# Patient Record
Sex: FEMALE | Race: WHITE | HISPANIC OR LATINO | Employment: UNEMPLOYED | URBAN - METROPOLITAN AREA
[De-identification: names, ages, dates, MRNs, and addresses within clinical notes are randomized per-mention and may not be internally consistent; named-entity substitution may affect disease eponyms.]

---

## 2018-04-12 LAB
ABO + RH BLD: NORMAL
HBV SURFACE AG SERPL QL IA: NEGATIVE
HEMOGLOBIN BANDS: NORMAL
HIV 1+2 AB+HIV1 P24 AG SERPL QL IA: NEGATIVE
INDIRECT COOMBS: NEGATIVE
RPR: NEGATIVE
RUBELLA IMMUNE STATUS: NORMAL

## 2018-04-17 LAB — CYSTIC FIBROSIS MUTATION PNL: NEGATIVE

## 2018-06-28 DIAGNOSIS — Z36.89 ENCOUNTER FOR FETAL ANATOMIC SURVEY: Primary | ICD-10-CM

## 2018-07-16 ENCOUNTER — OFFICE VISIT (OUTPATIENT)
Dept: MATERNAL FETAL MEDICINE | Facility: CLINIC | Age: 23
End: 2018-07-16
Payer: MEDICAID

## 2018-07-16 DIAGNOSIS — Z36.89 ENCOUNTER FOR FETAL ANATOMIC SURVEY: ICD-10-CM

## 2018-07-16 DIAGNOSIS — Z36.86 ENCOUNTER FOR ANTENATAL SCREENING FOR CERVICAL LENGTH: Primary | ICD-10-CM

## 2018-07-16 DIAGNOSIS — O26.872 SHORT CERVICAL LENGTH DURING PREGNANCY IN SECOND TRIMESTER: ICD-10-CM

## 2018-07-16 PROCEDURE — 76805 OB US >/= 14 WKS SNGL FETUS: CPT | Mod: 26,S$PBB,, | Performed by: OBSTETRICS & GYNECOLOGY

## 2018-07-16 PROCEDURE — 99499 UNLISTED E&M SERVICE: CPT | Mod: S$PBB,,, | Performed by: OBSTETRICS & GYNECOLOGY

## 2018-07-16 PROCEDURE — 76817 TRANSVAGINAL US OBSTETRIC: CPT | Mod: PBBFAC | Performed by: OBSTETRICS & GYNECOLOGY

## 2018-07-16 PROCEDURE — 76805 OB US >/= 14 WKS SNGL FETUS: CPT | Mod: PBBFAC | Performed by: OBSTETRICS & GYNECOLOGY

## 2018-07-16 PROCEDURE — 76817 TRANSVAGINAL US OBSTETRIC: CPT | Mod: 26,S$PBB,, | Performed by: OBSTETRICS & GYNECOLOGY

## 2018-07-16 NOTE — PROGRESS NOTES
Obstetrical ultrasound completed today.  See report in imaging section of [facility].

## 2018-07-30 ENCOUNTER — OFFICE VISIT (OUTPATIENT)
Dept: MATERNAL FETAL MEDICINE | Facility: CLINIC | Age: 23
End: 2018-07-30
Payer: MEDICAID

## 2018-07-30 DIAGNOSIS — Z36.86 ENCOUNTER FOR ANTENATAL SCREENING FOR CERVICAL LENGTH: ICD-10-CM

## 2018-07-30 PROCEDURE — 76817 TRANSVAGINAL US OBSTETRIC: CPT | Mod: PBBFAC | Performed by: PEDIATRICS

## 2018-07-30 PROCEDURE — 76816 OB US FOLLOW-UP PER FETUS: CPT | Mod: 26,S$PBB,, | Performed by: PEDIATRICS

## 2018-07-30 PROCEDURE — 99499 UNLISTED E&M SERVICE: CPT | Mod: S$PBB,,, | Performed by: PEDIATRICS

## 2018-07-30 PROCEDURE — 76816 OB US FOLLOW-UP PER FETUS: CPT | Mod: PBBFAC | Performed by: PEDIATRICS

## 2018-07-30 PROCEDURE — 76817 TRANSVAGINAL US OBSTETRIC: CPT | Mod: 26,S$PBB,, | Performed by: PEDIATRICS

## 2018-07-30 NOTE — PROGRESS NOTES
"Indication  ========    Evaluation of fetal growth, finish anatomy/ cervix .    Pregnancy History  ==============    Maternal Lab Tests  Result: no screenings  Wants to know gender: yes    Method  ======    Transvaginal ultrasound examination, Transabdominal ultrasound examination. View: Good view.    Pregnancy  =========    Rush pregnancy. Number of fetuses: 1.    Dating  ======    Cycle: regular cycle  GA by "stated dating" 21 w + 4 d  JAVAN by "stated dating": 12/6/2018  Assigned: Dating performed on 07/16/2018, based on the external assessment  Assigned GA 21 w + 4 d  Assigned JAVAN: 12/6/2018    General Evaluation  ==============    Cardiac activity: present.  bpm.  Fetal movements: visualized.  Presentation: cephalic.  Placenta: posterior.  Umbilical cord: previously documented .  Amniotic fluid: normal amount.    Fetal Biometry  ============    Fetal Biometry  Calculated by: Hadlock (BPD-HC-AC-FL)   bpm    Fetal Anatomy  ============    Cranium: normal  4-chamber view: normal  LVOT: normal  Stomach: normal  Kidneys: normal  Bladder: normal  Genitals: documented previously  Lumbar spine: normal  Sacral spine: normal  Gender: male  Wants to know gender: yes  Other: A full anatomy survey previously performed.    Maternal Structures  ===============    Uterus / Cervix  Cervical length 28.1 mm            Impression  =========    The fetal anatomic survey was completed today, and no fetal structural abnormalities were noted.    Growth was not performed (only 2 weeks). The AFV is normal.      CL was <3 cm last visit. Today, TV US measurements are 2.8 cm and 3.1 cm without funneling or any other abnormality.            Recommendation  ==============    1. Return in 2 weeks for growth.  2. Recheck CL.  3. Other testing as indicated.    Thank you again for allowing us to participate in the care of your patients. If you have any questions concerning today's consultation, feel free  to contact me or one of " my partners. We can be reached at [phone] during normal business hours. If you have a question after normal  business hours, please contact Labor and Delivery at [phone].

## 2018-08-13 ENCOUNTER — OFFICE VISIT (OUTPATIENT)
Dept: MATERNAL FETAL MEDICINE | Facility: CLINIC | Age: 23
End: 2018-08-13
Payer: MEDICAID

## 2018-08-13 DIAGNOSIS — Z36.86 ENCOUNTER FOR ANTENATAL SCREENING FOR CERVICAL LENGTH: ICD-10-CM

## 2018-08-13 PROCEDURE — 99499 UNLISTED E&M SERVICE: CPT | Mod: S$PBB,,, | Performed by: PEDIATRICS

## 2018-08-13 PROCEDURE — 76817 TRANSVAGINAL US OBSTETRIC: CPT | Mod: 26,S$PBB,, | Performed by: PEDIATRICS

## 2018-08-13 PROCEDURE — 76817 TRANSVAGINAL US OBSTETRIC: CPT | Mod: PBBFAC | Performed by: PEDIATRICS

## 2018-08-13 NOTE — PROGRESS NOTES
"Indication  ========    cervix.    Pregnancy History  ==============    Maternal Lab Tests  Result: no screenings  Wants to know gender: yes    Method  ======    Transvaginal ultrasound examination, [place] E10. View: Good view.    Pregnancy  =========    Rush pregnancy. Number of fetuses: 1.    Dating  ======    Cycle: regular cycle  GA by "stated dating" 23 w + 4 d  JAVAN by "stated dating": 12/6/2018  Assigned: Dating performed on 07/16/2018, based on the external assessment  Assigned GA 23 w + 4 d  Assigned JAVAN: 12/6/2018    General Evaluation  ==============    Cardiac activity: present.  bpm.    Fetal Biometry  ============    Fetal Biometry  Calculated by: Hadlock (BPD-HC-AC-FL)   bpm    Fetal Anatomy  ============    Gender: male.    Maternal Structures  ===============    Uterus / Cervix  Cervical length 36.6 mm          Impression  =========    TV US CL is 3.7 cm without funneling or beaking.          Recommendation  ==============    Repeat ultrasound study as clinically indicated.    Thank you again for allowing us to participate in the care of your patients. If you have any questions concerning today's consultation, feel free  to contact me or one of my partners. We can be reached at [phone] during normal business hours. If you have a question after normal  business hours, please contact Labor and Delivery at [phone].    "

## 2018-08-23 LAB
GLUCOSE SERPL-MCNC: 60 MG/DL
HCT VFR BLD AUTO: 34 % (ref 36–46)
HGB BLD-MCNC: 11 G/DL (ref 12–16)
MCV RBC AUTO: 96 FL (ref 82–108)
PLATELET # BLD AUTO: 253 K/ΜL (ref 150–399)

## 2018-10-23 ENCOUNTER — APPOINTMENT (OUTPATIENT)
Dept: LAB | Facility: HOSPITAL | Age: 23
End: 2018-10-23
Attending: OBSTETRICS & GYNECOLOGY
Payer: MEDICAID

## 2018-10-23 ENCOUNTER — INITIAL PRENATAL (OUTPATIENT)
Dept: OBSTETRICS AND GYNECOLOGY | Facility: CLINIC | Age: 23
End: 2018-10-23
Payer: MEDICAID

## 2018-10-23 VITALS
HEIGHT: 64 IN | BODY MASS INDEX: 24.54 KG/M2 | SYSTOLIC BLOOD PRESSURE: 102 MMHG | WEIGHT: 143.75 LBS | DIASTOLIC BLOOD PRESSURE: 60 MMHG

## 2018-10-23 DIAGNOSIS — Z34.03 ENCOUNTER FOR SUPERVISION OF NORMAL FIRST PREGNANCY IN THIRD TRIMESTER: ICD-10-CM

## 2018-10-23 DIAGNOSIS — R87.610 ASCUS OF CERVIX WITH NEGATIVE HIGH RISK HPV: ICD-10-CM

## 2018-10-23 LAB
ERYTHROCYTE [DISTWIDTH] IN BLOOD BY AUTOMATED COUNT: 13.6 %
HCT VFR BLD AUTO: 39.4 %
HGB BLD-MCNC: 12.5 G/DL
MCH RBC QN AUTO: 31.1 PG
MCHC RBC AUTO-ENTMCNC: 31.7 G/DL
MCV RBC AUTO: 98 FL
PLATELET # BLD AUTO: 260 K/UL
PMV BLD AUTO: 10.6 FL
RBC # BLD AUTO: 4.02 M/UL
WBC # BLD AUTO: 13.99 K/UL

## 2018-10-23 PROCEDURE — 99213 OFFICE O/P EST LOW 20 MIN: CPT | Mod: TH,S$PBB,, | Performed by: STUDENT IN AN ORGANIZED HEALTH CARE EDUCATION/TRAINING PROGRAM

## 2018-10-23 PROCEDURE — 99999 PR PBB SHADOW E&M-EST. PATIENT-LVL III: CPT | Mod: PBBFAC,,, | Performed by: STUDENT IN AN ORGANIZED HEALTH CARE EDUCATION/TRAINING PROGRAM

## 2018-10-23 PROCEDURE — 85027 COMPLETE CBC AUTOMATED: CPT

## 2018-10-23 PROCEDURE — 99213 OFFICE O/P EST LOW 20 MIN: CPT | Mod: PBBFAC,TH,PO | Performed by: STUDENT IN AN ORGANIZED HEALTH CARE EDUCATION/TRAINING PROGRAM

## 2018-10-23 NOTE — PROGRESS NOTES
Subjective:       Patient ID: Adrianna Berger is a 23 y.o. female.    Chief Complaint:  Routine Prenatal Visit (bowel movements are irregular. pt feels pain when trying to have a bowel movement. pt also is having a breakout of small red bumps on stomach and chest.)      History of Present Illness  Adrianna Berger is a 23 y.o. F at 33w6d presents for initial ob visit after transfer from [facility].   This IUP is complicated by anemia and h/o asthma..  Patient denies contractions, denies vaginal bleeding, denies LOF.   Fetal Movement: normal.            GYN & OB History  Patient's last menstrual period was 2018.   Date of Last Pap: No result found    OB History    Para Term  AB Living   1             SAB TAB Ectopic Multiple Live Births                  # Outcome Date GA Lbr Jr/2nd Weight Sex Delivery Anes PTL Lv   1 Current                   Review of Systems  Review of Systems   Constitutional: Negative for activity change, appetite change, chills and fever.   Eyes: Negative for visual disturbance.   Respiratory: Negative for cough and shortness of breath.    Cardiovascular: Negative for chest pain.   Gastrointestinal: Negative for abdominal pain, constipation, nausea and vomiting.   Genitourinary: Negative for dysuria, frequency, hematuria, pelvic pain, urgency, vaginal bleeding, vaginal discharge and vaginal odor.   Musculoskeletal: Negative for myalgias.   Skin:  Negative for rash.   Neurological: Negative for headaches.   Psychiatric/Behavioral: Negative for depression and sleep disturbance. The patient is not nervous/anxious.            Objective:    Physical Exam:   Constitutional: She is oriented to person, place, and time. She appears well-developed and well-nourished. No distress.    HENT:   Head: Normocephalic and atraumatic.   Nose: Nose normal.    Eyes: Conjunctivae are normal. Right eye exhibits no discharge. Left eye exhibits no discharge. No scleral icterus.     Neck: Normal range of motion. Neck supple.    Cardiovascular: Normal rate, regular rhythm, normal heart sounds and intact distal pulses.  Exam reveals no clubbing and no edema.     Pulmonary/Chest: Effort normal and breath sounds normal. No respiratory distress. She has no wheezes.        Abdominal: Soft. She exhibits no mass. There is no tenderness. There is no rebound.             Musculoskeletal: Normal range of motion and moves all extremeties. She exhibits no edema or tenderness.       Neurological: She is alert and oriented to person, place, and time.    Skin: Skin is warm and dry. No rash noted. She is not diaphoretic. No erythema. Nails show no clubbing.    Psychiatric: She has a normal mood and affect. Her behavior is normal. Judgment and thought content normal.          Assessment:        1. Encounter for supervision of normal first pregnancy in third trimester    2. ASCUS of cervix with negative high risk HPV - needs pap pp               Plan:      Adrianna was seen today for routine prenatal visit.    Diagnoses and all orders for this visit:    Encounter for supervision of normal first pregnancy in third trimester  - no complaints today   - return to care in two weeks   - counseled patient on delivery expectations; all questions answered  - repeat cbc and follow up for possible discontinuation of iron due to patient inability to tolerate due to constipation    ASCUS of cervix with negative high risk HPV - needs pap pp        Orders Placed This Encounter   Procedures    CBC Without Differential    Hemoglobin Electrophoresis,Hgb A2 [place].    OB Glucose Screen    Cystic Fibrosis Mutation Panel    HIV-1 and HIV-2 antibodies    RPR    Rubella antibody, IgG    ABO/Rh    Teresa test, indirect, qualitative    Hbsag - Prenatal       Follow-up in about 2 weeks (around 11/6/2018).      Monae Jolley MD  OBGYN, PGY-1

## 2018-10-23 NOTE — PROGRESS NOTES
"Seen and examined.  Agree with note.  All questions answered  For the constipation, push fluids and maintain a high fiber diet with plenty of roughage. Drink 6-8 large glasses of water daily to avoid constipation in the future. Fruits that start with "p" are high in fiber and good for constipation (i.e. Peaches, plums, pears, pineapple).  Avoid bananas, rice, and bread.  Baked sweet and white potatoes are high in fiber as well as dried fruits and cereals.  Milk of magnesia and miralax may be used a necessary.  Call if symptoms persist or worsen.   "

## 2018-11-06 ENCOUNTER — APPOINTMENT (OUTPATIENT)
Dept: LAB | Facility: HOSPITAL | Age: 23
End: 2018-11-06
Attending: OBSTETRICS & GYNECOLOGY
Payer: MEDICAID

## 2018-11-06 ENCOUNTER — ROUTINE PRENATAL (OUTPATIENT)
Dept: OBSTETRICS AND GYNECOLOGY | Facility: CLINIC | Age: 23
End: 2018-11-06
Payer: MEDICAID

## 2018-11-06 VITALS
WEIGHT: 145.75 LBS | BODY MASS INDEX: 25.01 KG/M2 | DIASTOLIC BLOOD PRESSURE: 64 MMHG | SYSTOLIC BLOOD PRESSURE: 116 MMHG

## 2018-11-06 DIAGNOSIS — D50.9 IRON DEFICIENCY ANEMIA, UNSPECIFIED IRON DEFICIENCY ANEMIA TYPE: ICD-10-CM

## 2018-11-06 DIAGNOSIS — Z34.03 ENCOUNTER FOR SUPERVISION OF NORMAL FIRST PREGNANCY IN THIRD TRIMESTER: Primary | ICD-10-CM

## 2018-11-06 LAB
C TRACH DNA SPEC QL NAA+PROBE: NOT DETECTED
N GONORRHOEA DNA SPEC QL NAA+PROBE: NOT DETECTED

## 2018-11-06 PROCEDURE — 87086 URINE CULTURE/COLONY COUNT: CPT

## 2018-11-06 PROCEDURE — 87081 CULTURE SCREEN ONLY: CPT | Mod: 59

## 2018-11-06 PROCEDURE — 87491 CHLMYD TRACH DNA AMP PROBE: CPT

## 2018-11-06 PROCEDURE — 86703 HIV-1/HIV-2 1 RESULT ANTBDY: CPT

## 2018-11-06 PROCEDURE — 99999 PR PBB SHADOW E&M-EST. PATIENT-LVL II: CPT | Mod: PBBFAC,,, | Performed by: STUDENT IN AN ORGANIZED HEALTH CARE EDUCATION/TRAINING PROGRAM

## 2018-11-06 PROCEDURE — 99213 OFFICE O/P EST LOW 20 MIN: CPT | Mod: TH,S$PBB,, | Performed by: STUDENT IN AN ORGANIZED HEALTH CARE EDUCATION/TRAINING PROGRAM

## 2018-11-06 PROCEDURE — 99212 OFFICE O/P EST SF 10 MIN: CPT | Mod: PBBFAC,TH,PO | Performed by: STUDENT IN AN ORGANIZED HEALTH CARE EDUCATION/TRAINING PROGRAM

## 2018-11-06 PROCEDURE — 86592 SYPHILIS TEST NON-TREP QUAL: CPT

## 2018-11-06 NOTE — PROGRESS NOTES
Subjective:       Patient ID: Adrianna Berger is a 23 y.o. female.    Chief Complaint:  Routine Prenatal Visit      History of Present Illness  Adrianna Berger is a 23 y.o. F at 33w6d presents for initial ob visit after transfer from [facility].   This IUP is complicated by anemia and h/o asthma. Patient has no complaints today. Patient denies contractions, denies vaginal bleeding, denies LOF.   Fetal Movement: normal.                 GYN & OB History  Patient's last menstrual period was 2018.   Date of Last Pap: No result found    OB History    Para Term  AB Living   1             SAB TAB Ectopic Multiple Live Births                  # Outcome Date GA Lbr Jr/2nd Weight Sex Delivery Anes PTL Lv   1 Current                   Review of Systems  Review of Systems   Constitutional: Negative for activity change, appetite change, chills and fever.   Eyes: Negative for visual disturbance.   Respiratory: Negative for cough and shortness of breath.    Cardiovascular: Negative for chest pain.   Gastrointestinal: Negative for abdominal pain, constipation, nausea and vomiting.   Genitourinary: Negative for dysuria, frequency, hematuria, pelvic pain, urgency, vaginal bleeding, vaginal discharge and vaginal odor.   Musculoskeletal: Negative for myalgias.   Skin:  Negative for rash.   Neurological: Negative for headaches.   Psychiatric/Behavioral: Negative for depression and sleep disturbance. The patient is not nervous/anxious.            Objective:    Physical Exam:   Constitutional: She is oriented to person, place, and time. She appears well-developed and well-nourished. No distress.    HENT:   Head: Normocephalic and atraumatic.   Nose: Nose normal.    Eyes: Conjunctivae are normal. Right eye exhibits no discharge. Left eye exhibits no discharge. No scleral icterus.    Neck: Normal range of motion. Neck supple.    Cardiovascular: Normal rate, regular rhythm, normal heart sounds and  intact distal pulses.  Exam reveals no clubbing and no edema.     Pulmonary/Chest: Effort normal and breath sounds normal. No respiratory distress. She has no wheezes.        Abdominal: Soft. She exhibits no mass. There is no tenderness. There is no rebound.   gravid             Musculoskeletal: Normal range of motion and moves all extremeties. She exhibits no edema or tenderness.       Neurological: She is alert and oriented to person, place, and time.    Skin: Skin is warm and dry. No rash noted. She is not diaphoretic. No erythema. Nails show no clubbing.    Psychiatric: She has a normal mood and affect. Her behavior is normal. Judgment and thought content normal.          Assessment:        1. Encounter for supervision of normal first pregnancy in third trimester/flu/tdap    2. Iron deficiency anemia, unspecified iron deficiency anemia type               Plan:      Adrianna was seen today for routine prenatal visit.    Diagnoses and all orders for this visit:    Encounter for supervision of normal first pregnancy in third trimester/flu/tdap  -     Strep B Screen, Vaginal / Rectal  -     RPR  -     HIV-1 and HIV-2 antibodies  -     CBC auto differential  -     C. trachomatis/N. gonorrhoeae by AMP DNA  -     Urine culture  - no complaints; labor precautions reviewed    Iron deficiency anemia, unspecified iron deficiency anemia type  - CBC checked last week and hemoglobin 12.5  - ok'ed patient to discontinue iron pills as she cannot tolerate      Orders Placed This Encounter   Procedures    Strep B Screen, Vaginal / Rectal    C. trachomatis/N. gonorrhoeae by AMP DNA    Urine culture    RPR    HIV-1 and HIV-2 antibodies    CBC auto differential       Follow-up in about 1 week (around 11/13/2018).

## 2018-11-07 LAB
BACTERIA UR CULT: NORMAL
HIV 1+2 AB+HIV1 P24 AG SERPL QL IA: NEGATIVE
RPR SER QL: NORMAL

## 2018-11-08 LAB — BACTERIA SPEC AEROBE CULT: NORMAL

## 2018-11-13 ENCOUNTER — ROUTINE PRENATAL (OUTPATIENT)
Dept: OBSTETRICS AND GYNECOLOGY | Facility: CLINIC | Age: 23
End: 2018-11-13
Payer: MEDICAID

## 2018-11-13 VITALS
SYSTOLIC BLOOD PRESSURE: 116 MMHG | BODY MASS INDEX: 25.13 KG/M2 | WEIGHT: 146.38 LBS | DIASTOLIC BLOOD PRESSURE: 65 MMHG

## 2018-11-13 DIAGNOSIS — Z34.03 ENCOUNTER FOR SUPERVISION OF NORMAL FIRST PREGNANCY IN THIRD TRIMESTER: Primary | ICD-10-CM

## 2018-11-13 PROCEDURE — 99212 OFFICE O/P EST SF 10 MIN: CPT | Mod: PBBFAC,TH,PO | Performed by: STUDENT IN AN ORGANIZED HEALTH CARE EDUCATION/TRAINING PROGRAM

## 2018-11-13 PROCEDURE — 99999 PR PBB SHADOW E&M-EST. PATIENT-LVL II: CPT | Mod: PBBFAC,,, | Performed by: STUDENT IN AN ORGANIZED HEALTH CARE EDUCATION/TRAINING PROGRAM

## 2018-11-13 PROCEDURE — 99213 OFFICE O/P EST LOW 20 MIN: CPT | Mod: TH,S$PBB,, | Performed by: STUDENT IN AN ORGANIZED HEALTH CARE EDUCATION/TRAINING PROGRAM

## 2018-11-13 NOTE — PROGRESS NOTES
Subjective:       Patient ID: Adrianna Berger is a 23 y.o. female.    Chief Complaint:  Routine Prenatal Visit      History of Present Illness  HPI  Adrianna Berger is a 23 y.o. F at 33w6d presents for routine ob visit. Patient is a transfer from [facility].   This IUP is complicated by anemia and h/o asthma. Patient has no complaints today. Patient denies contractions, denies vaginal bleeding, denies LOF.   Fetal Movement: normal.           GYN & OB History  Patient's last menstrual period was 2018.   Date of Last Pap: No result found    OB History    Para Term  AB Living   1             SAB TAB Ectopic Multiple Live Births                  # Outcome Date GA Lbr Jr/2nd Weight Sex Delivery Anes PTL Lv   1 Current                   Review of Systems  Review of Systems   Constitutional: Negative for activity change, appetite change, chills and fever.   Eyes: Negative for visual disturbance.   Respiratory: Negative for cough and shortness of breath.    Cardiovascular: Negative for chest pain.   Gastrointestinal: Negative for abdominal pain, constipation, nausea and vomiting.   Genitourinary: Negative for dysuria, frequency, hematuria, pelvic pain, urgency, vaginal bleeding, vaginal discharge and vaginal odor.   Musculoskeletal: Negative for myalgias.   Skin:  Negative for rash.   Neurological: Negative for headaches.   Psychiatric/Behavioral: Negative for depression and sleep disturbance. The patient is not nervous/anxious.            Objective:    Physical Exam:   Constitutional: She is oriented to person, place, and time. She appears well-developed and well-nourished. No distress.    HENT:   Head: Normocephalic and atraumatic.   Nose: Nose normal.    Eyes: Conjunctivae are normal. Right eye exhibits no discharge. Left eye exhibits no discharge. No scleral icterus.    Neck: Normal range of motion. Neck supple.    Cardiovascular: Normal rate, regular rhythm, normal heart sounds  and intact distal pulses.  Exam reveals no clubbing and no edema.     Pulmonary/Chest: Effort normal and breath sounds normal. No respiratory distress. She has no wheezes.        Abdominal: Soft. She exhibits no mass. There is no tenderness. There is no rebound.             Musculoskeletal: Normal range of motion and moves all extremeties. She exhibits no edema or tenderness.       Neurological: She is alert and oriented to person, place, and time.    Skin: Skin is warm and dry. No rash noted. She is not diaphoretic. No erythema. Nails show no clubbing.    Psychiatric: She has a normal mood and affect. Her behavior is normal. Judgment and thought content normal.          Assessment:        1. Encounter for supervision of normal first pregnancy in third trimester/flu/tdap               Plan:      Adrianna was seen today for routine prenatal visit.    Diagnoses and all orders for this visit:    Encounter for supervision of normal first pregnancy in third trimester/flu/tdap  - patient has no complaints today   - UTD with 3T labs   - labor precautions given      No orders of the defined types were placed in this encounter.      Follow-up in about 1 week (around 11/20/2018).       Monae Jolley MD  OBGYN, PGY-1

## 2018-11-21 ENCOUNTER — ROUTINE PRENATAL (OUTPATIENT)
Dept: OBSTETRICS AND GYNECOLOGY | Facility: CLINIC | Age: 23
End: 2018-11-21
Payer: MEDICAID

## 2018-11-21 VITALS
WEIGHT: 149.94 LBS | BODY MASS INDEX: 25.73 KG/M2 | SYSTOLIC BLOOD PRESSURE: 116 MMHG | DIASTOLIC BLOOD PRESSURE: 60 MMHG

## 2018-11-21 DIAGNOSIS — Z3A.38 38 WEEKS GESTATION OF PREGNANCY: ICD-10-CM

## 2018-11-21 DIAGNOSIS — Z34.03 ENCOUNTER FOR SUPERVISION OF NORMAL FIRST PREGNANCY IN THIRD TRIMESTER: Primary | ICD-10-CM

## 2018-11-21 PROCEDURE — 99212 OFFICE O/P EST SF 10 MIN: CPT | Mod: PBBFAC,TH,PO | Performed by: STUDENT IN AN ORGANIZED HEALTH CARE EDUCATION/TRAINING PROGRAM

## 2018-11-21 PROCEDURE — 99999 PR PBB SHADOW E&M-EST. PATIENT-LVL II: CPT | Mod: PBBFAC,,,

## 2018-11-21 PROCEDURE — 99213 OFFICE O/P EST LOW 20 MIN: CPT | Mod: TH,S$PBB,, | Performed by: OBSTETRICS & GYNECOLOGY

## 2018-11-21 NOTE — PROGRESS NOTES
Complaints today: None. Overall doing well. Denies contractions, vaginal bleeding, and LOF. Reports good fetal movement.    /60   Wt 68 kg (149 lb 14.6 oz)   LMP 2018   BMI 25.73 kg/m²      23 y.o., at 38w0d by Estimated Date of Delivery: 18  Patient Active Problem List   Diagnosis    Encounter for supervision of normal first pregnancy in third trimester/flu/tdap    ASCUS of cervix with negative high risk HPV - needs pap pp     OB History    Para Term  AB Living   1             SAB TAB Ectopic Multiple Live Births                  # Outcome Date GA Lbr Jr/2nd Weight Sex Delivery Anes PTL Lv   1 Current                   Dating reviewed    Allergies and problem list reviewed and updated    Medical and surgical history reviewed    Prenatal labs reviewed and updated    Physical Exam:  ABD: soft, gravid, nontender  SVE: 0/40/-3    Assessment:  22yo  at 38w0d here for DERICK visit, currently doing well.     Plan:   - Labs up to date  - GBS neg  - Labor precautions reviewed, patient voiced understanding  - Follow-up 1 week    Michela Echevarria MD  OBGYN, PGY-1

## 2018-11-28 ENCOUNTER — ROUTINE PRENATAL (OUTPATIENT)
Dept: OBSTETRICS AND GYNECOLOGY | Facility: CLINIC | Age: 23
End: 2018-11-28
Payer: MEDICAID

## 2018-11-28 VITALS
WEIGHT: 149.69 LBS | SYSTOLIC BLOOD PRESSURE: 110 MMHG | BODY MASS INDEX: 25.69 KG/M2 | DIASTOLIC BLOOD PRESSURE: 66 MMHG

## 2018-11-28 DIAGNOSIS — Z34.03 ENCOUNTER FOR SUPERVISION OF NORMAL FIRST PREGNANCY IN THIRD TRIMESTER: Primary | ICD-10-CM

## 2018-11-28 PROCEDURE — 99213 OFFICE O/P EST LOW 20 MIN: CPT | Mod: PBBFAC,TH,PO | Performed by: ADVANCED PRACTICE MIDWIFE

## 2018-11-28 PROCEDURE — 99999 PR PBB SHADOW E&M-EST. PATIENT-LVL III: CPT | Mod: PBBFAC,,, | Performed by: ADVANCED PRACTICE MIDWIFE

## 2018-11-28 PROCEDURE — 99213 OFFICE O/P EST LOW 20 MIN: CPT | Mod: TH,S$PBB,, | Performed by: ADVANCED PRACTICE MIDWIFE

## 2018-11-28 NOTE — PROGRESS NOTES
Chief Complaint   Patient presents with    Routine Prenatal Visit       23 y.o., at 39w0d by Estimated Date of Delivery: 18    Complaints today: Accompanied by her husbans who speaks English.    ROS  OBSTETRICS:   Contractions none   Bleeding none   Loss of fluid none   Fetal mvmnt Reports good FM, reinforced BID  GASTRO:   Nausea denies   Vomiting denies      OB History    Para Term  AB Living   1             SAB TAB Ectopic Multiple Live Births                  # Outcome Date GA Lbr Jr/2nd Weight Sex Delivery Anes PTL Lv   1 Current                   Dating reviewed  Allergies and problem list reviewed and updated  Medical and surgical history reviewed  Prenatal labs reviewed and updated    PHYSICAL EXAM  /66   Wt 67.9 kg (149 lb 11.1 oz)   LMP 2018   BMI 25.69 kg/m²     GENERAL: No acute distress  NEURO: Alert and oriented x3  PSYCH: Normal mood and affect  PULMONARY: Non-labored respiration  ABDomen: Soft, gravid, nontender    ASSESSMENT AND PLAN    G1 Problems (from 18 to present)     Problem Noted Resolved    Encounter for supervision of normal first pregnancy in third trimester/flu/tdap 10/23/2018 by Rosalia Pedraza MD No              Reviewed s/s active labor, rom, bleeding and if occurs report to L&D.  Follow-up: 1 weeks- if remains undelivered will schedule IOL

## 2018-12-04 ENCOUNTER — ANESTHESIA (OUTPATIENT)
Dept: OBSTETRICS AND GYNECOLOGY | Facility: OTHER | Age: 23
End: 2018-12-04
Payer: MEDICAID

## 2018-12-04 ENCOUNTER — HOSPITAL ENCOUNTER (INPATIENT)
Facility: OTHER | Age: 23
LOS: 2 days | Discharge: HOME | End: 2018-12-06
Attending: OBSTETRICS & GYNECOLOGY | Admitting: OBSTETRICS & GYNECOLOGY
Payer: MEDICAID

## 2018-12-04 ENCOUNTER — ANESTHESIA EVENT (OUTPATIENT)
Dept: OBSTETRICS AND GYNECOLOGY | Facility: OTHER | Age: 23
End: 2018-12-04
Payer: MEDICAID

## 2018-12-04 DIAGNOSIS — Z3A.39 PREGNANCY WITH 39 COMPLETED WEEKS GESTATION: ICD-10-CM

## 2018-12-04 DIAGNOSIS — O13.3 TRANSIENT HYPERTENSION OF PREGNANCY IN THIRD TRIMESTER: ICD-10-CM

## 2018-12-04 LAB
ABO + RH BLD: NORMAL
ALBUMIN SERPL BCP-MCNC: 3.1 G/DL
ALP SERPL-CCNC: 181 U/L
ALT SERPL W/O P-5'-P-CCNC: 19 U/L
ANION GAP SERPL CALC-SCNC: 10 MMOL/L
AST SERPL-CCNC: 19 U/L
BASOPHILS # BLD AUTO: 0.02 K/UL
BASOPHILS NFR BLD: 0.1 %
BILIRUB SERPL-MCNC: 0.2 MG/DL
BILIRUB SERPL-MCNC: NORMAL MG/DL
BLD GP AB SCN CELLS X3 SERPL QL: NORMAL
BLOOD URINE, POC: NORMAL
BUN SERPL-MCNC: 14 MG/DL
CALCIUM SERPL-MCNC: 9.5 MG/DL
CHLORIDE SERPL-SCNC: 107 MMOL/L
CO2 SERPL-SCNC: 20 MMOL/L
COLOR, POC UA: NORMAL
CREAT SERPL-MCNC: 0.7 MG/DL
CREAT UR-MCNC: 132.8 MG/DL
DIFFERENTIAL METHOD: ABNORMAL
EOSINOPHIL # BLD AUTO: 0.2 K/UL
EOSINOPHIL NFR BLD: 1.1 %
ERYTHROCYTE [DISTWIDTH] IN BLOOD BY AUTOMATED COUNT: 13.4 %
EST. GFR  (AFRICAN AMERICAN): >60 ML/MIN/1.73 M^2
EST. GFR  (NON AFRICAN AMERICAN): >60 ML/MIN/1.73 M^2
GLUCOSE SERPL-MCNC: 85 MG/DL
GLUCOSE UR QL STRIP: NORMAL
HCT VFR BLD AUTO: 40.8 %
HGB BLD-MCNC: 13.6 G/DL
KETONES UR QL STRIP: NORMAL
LEUKOCYTE ESTERASE URINE, POC: NORMAL
LYMPHOCYTES # BLD AUTO: 1.9 K/UL
LYMPHOCYTES NFR BLD: 11.6 %
MCH RBC QN AUTO: 31.2 PG
MCHC RBC AUTO-ENTMCNC: 33.3 G/DL
MCV RBC AUTO: 94 FL
MONOCYTES # BLD AUTO: 1.3 K/UL
MONOCYTES NFR BLD: 8 %
NEUTROPHILS # BLD AUTO: 13.1 K/UL
NEUTROPHILS NFR BLD: 78.8 %
NITRITE, POC UA: NORMAL
PH, POC UA: 7
PLATELET # BLD AUTO: 224 K/UL
PMV BLD AUTO: 10.8 FL
POTASSIUM SERPL-SCNC: 4.2 MMOL/L
PROT SERPL-MCNC: 6.9 G/DL
PROT UR-MCNC: 10 MG/DL
PROT/CREAT UR: 0.08 MG/G{CREAT}
PROTEIN, POC: NORMAL
RBC # BLD AUTO: 4.36 M/UL
SODIUM SERPL-SCNC: 137 MMOL/L
SPECIFIC GRAVITY, POC UA: 1.02
UROBILINOGEN, POC UA: NORMAL
WBC # BLD AUTO: 16.62 K/UL

## 2018-12-04 PROCEDURE — 25000003 PHARM REV CODE 250: Performed by: STUDENT IN AN ORGANIZED HEALTH CARE EDUCATION/TRAINING PROGRAM

## 2018-12-04 PROCEDURE — 86901 BLOOD TYPING SEROLOGIC RH(D): CPT

## 2018-12-04 PROCEDURE — 82570 ASSAY OF URINE CREATININE: CPT

## 2018-12-04 PROCEDURE — 99285 EMERGENCY DEPT VISIT HI MDM: CPT | Mod: 25

## 2018-12-04 PROCEDURE — 0KQM0ZZ REPAIR PERINEUM MUSCLE, OPEN APPROACH: ICD-10-PCS | Performed by: OBSTETRICS & GYNECOLOGY

## 2018-12-04 PROCEDURE — 27200710 HC EPIDURAL INFUSION PUMP SET: Performed by: UROLOGY

## 2018-12-04 PROCEDURE — 27800517 HC TRAY,EPIDURAL-CONTINUOUS: Performed by: UROLOGY

## 2018-12-04 PROCEDURE — 62326 NJX INTERLAMINAR LMBR/SAC: CPT | Performed by: UROLOGY

## 2018-12-04 PROCEDURE — 4A0HXCZ MEASUREMENT OF PRODUCTS OF CONCEPTION, CARDIAC RATE, EXTERNAL APPROACH: ICD-10-PCS | Performed by: OBSTETRICS & GYNECOLOGY

## 2018-12-04 PROCEDURE — 11000001 HC ACUTE MED/SURG PRIVATE ROOM

## 2018-12-04 PROCEDURE — 51702 INSERT TEMP BLADDER CATH: CPT

## 2018-12-04 PROCEDURE — 85025 COMPLETE CBC W/AUTO DIFF WBC: CPT

## 2018-12-04 PROCEDURE — 59025 FETAL NON-STRESS TEST: CPT | Mod: 26,,, | Performed by: OBSTETRICS & GYNECOLOGY

## 2018-12-04 PROCEDURE — 99284 EMERGENCY DEPT VISIT MOD MDM: CPT | Mod: 25,,, | Performed by: OBSTETRICS & GYNECOLOGY

## 2018-12-04 PROCEDURE — 72200005 HC VAGINAL DELIVERY LEVEL II

## 2018-12-04 PROCEDURE — 59409 OBSTETRICAL CARE: CPT | Mod: AA,,, | Performed by: ANESTHESIOLOGY

## 2018-12-04 PROCEDURE — 59409 OBSTETRICAL CARE: CPT | Mod: GB,,, | Performed by: OBSTETRICS & GYNECOLOGY

## 2018-12-04 PROCEDURE — 59025 FETAL NON-STRESS TEST: CPT

## 2018-12-04 PROCEDURE — 80053 COMPREHEN METABOLIC PANEL: CPT

## 2018-12-04 PROCEDURE — 63600175 PHARM REV CODE 636 W HCPCS: Performed by: STUDENT IN AN ORGANIZED HEALTH CARE EDUCATION/TRAINING PROGRAM

## 2018-12-04 RX ORDER — SODIUM CHLORIDE, SODIUM LACTATE, POTASSIUM CHLORIDE, CALCIUM CHLORIDE 600; 310; 30; 20 MG/100ML; MG/100ML; MG/100ML; MG/100ML
INJECTION, SOLUTION INTRAVENOUS CONTINUOUS
Status: DISCONTINUED | OUTPATIENT
Start: 2018-12-04 | End: 2018-12-04

## 2018-12-04 RX ORDER — NALBUPHINE HYDROCHLORIDE 10 MG/ML
2.5 INJECTION, SOLUTION INTRAMUSCULAR; INTRAVENOUS; SUBCUTANEOUS ONCE
Status: DISCONTINUED | OUTPATIENT
Start: 2018-12-04 | End: 2018-12-04

## 2018-12-04 RX ORDER — FENTANYL/BUPIVACAINE/NS/PF 2MCG/ML-.1
PLASTIC BAG, INJECTION (ML) INJECTION CONTINUOUS
Status: DISCONTINUED | OUTPATIENT
Start: 2018-12-04 | End: 2018-12-04

## 2018-12-04 RX ORDER — OXYTOCIN/RINGER'S LACTATE 20/1000 ML
333 PLASTIC BAG, INJECTION (ML) INTRAVENOUS CONTINUOUS
Status: ACTIVE | OUTPATIENT
Start: 2018-12-04 | End: 2018-12-04

## 2018-12-04 RX ORDER — HYDROCODONE BITARTRATE AND ACETAMINOPHEN 10; 325 MG/1; MG/1
1 TABLET ORAL EVERY 4 HOURS PRN
Status: DISCONTINUED | OUTPATIENT
Start: 2018-12-04 | End: 2018-12-06 | Stop reason: HOSPADM

## 2018-12-04 RX ORDER — CARBOPROST TROMETHAMINE 250 UG/ML
250 INJECTION, SOLUTION INTRAMUSCULAR
Status: DISCONTINUED | OUTPATIENT
Start: 2018-12-04 | End: 2018-12-06 | Stop reason: HOSPADM

## 2018-12-04 RX ORDER — DIPHENHYDRAMINE HYDROCHLORIDE 50 MG/ML
25 INJECTION INTRAMUSCULAR; INTRAVENOUS EVERY 4 HOURS PRN
Status: DISCONTINUED | OUTPATIENT
Start: 2018-12-04 | End: 2018-12-06 | Stop reason: HOSPADM

## 2018-12-04 RX ORDER — DIPHENHYDRAMINE HCL 25 MG
25 CAPSULE ORAL EVERY 4 HOURS PRN
Status: DISCONTINUED | OUTPATIENT
Start: 2018-12-04 | End: 2018-12-06 | Stop reason: HOSPADM

## 2018-12-04 RX ORDER — ONDANSETRON 8 MG/1
8 TABLET, ORALLY DISINTEGRATING ORAL EVERY 8 HOURS PRN
Status: DISCONTINUED | OUTPATIENT
Start: 2018-12-04 | End: 2018-12-06 | Stop reason: HOSPADM

## 2018-12-04 RX ORDER — ONDANSETRON 8 MG/1
8 TABLET, ORALLY DISINTEGRATING ORAL EVERY 8 HOURS PRN
Status: CANCELLED | OUTPATIENT
Start: 2018-12-04

## 2018-12-04 RX ORDER — HYDROCORTISONE 25 MG/G
CREAM TOPICAL 3 TIMES DAILY PRN
Status: DISCONTINUED | OUTPATIENT
Start: 2018-12-04 | End: 2018-12-06 | Stop reason: HOSPADM

## 2018-12-04 RX ORDER — FAMOTIDINE 10 MG/ML
20 INJECTION INTRAVENOUS ONCE
Status: DISCONTINUED | OUTPATIENT
Start: 2018-12-04 | End: 2018-12-06 | Stop reason: HOSPADM

## 2018-12-04 RX ORDER — IBUPROFEN 600 MG/1
600 TABLET ORAL EVERY 6 HOURS
Status: DISCONTINUED | OUTPATIENT
Start: 2018-12-05 | End: 2018-12-06 | Stop reason: HOSPADM

## 2018-12-04 RX ORDER — OXYTOCIN/RINGER'S LACTATE 20/1000 ML
41.7 PLASTIC BAG, INJECTION (ML) INTRAVENOUS CONTINUOUS
Status: ACTIVE | OUTPATIENT
Start: 2018-12-04 | End: 2018-12-04

## 2018-12-04 RX ORDER — OXYTOCIN/RINGER'S LACTATE 20/1000 ML
41.65 PLASTIC BAG, INJECTION (ML) INTRAVENOUS CONTINUOUS
Status: CANCELLED | OUTPATIENT
Start: 2018-12-04 | End: 2018-12-05

## 2018-12-04 RX ORDER — HYDROCODONE BITARTRATE AND ACETAMINOPHEN 5; 325 MG/1; MG/1
1 TABLET ORAL EVERY 4 HOURS PRN
Status: DISCONTINUED | OUTPATIENT
Start: 2018-12-04 | End: 2018-12-06 | Stop reason: HOSPADM

## 2018-12-04 RX ORDER — SODIUM CHLORIDE 9 MG/ML
INJECTION, SOLUTION INTRAVENOUS
Status: DISCONTINUED | OUTPATIENT
Start: 2018-12-04 | End: 2018-12-04

## 2018-12-04 RX ORDER — FENTANYL/BUPIVACAINE/NS/PF 2MCG/ML-.1
PLASTIC BAG, INJECTION (ML) INJECTION
Status: DISPENSED
Start: 2018-12-04 | End: 2018-12-04

## 2018-12-04 RX ORDER — ACETAMINOPHEN 325 MG/1
650 TABLET ORAL EVERY 6 HOURS PRN
Status: DISCONTINUED | OUTPATIENT
Start: 2018-12-04 | End: 2018-12-06 | Stop reason: HOSPADM

## 2018-12-04 RX ORDER — MISOPROSTOL 200 UG/1
600 TABLET ORAL
Status: DISCONTINUED | OUTPATIENT
Start: 2018-12-04 | End: 2018-12-06 | Stop reason: HOSPADM

## 2018-12-04 RX ORDER — OXYTOCIN/RINGER'S LACTATE 20/1000 ML
41.65 PLASTIC BAG, INJECTION (ML) INTRAVENOUS CONTINUOUS
Status: CANCELLED | OUTPATIENT
Start: 2018-12-04 | End: 2018-12-04

## 2018-12-04 RX ORDER — SODIUM CITRATE AND CITRIC ACID MONOHYDRATE 334; 500 MG/5ML; MG/5ML
30 SOLUTION ORAL ONCE
Status: DISCONTINUED | OUTPATIENT
Start: 2018-12-04 | End: 2018-12-06 | Stop reason: HOSPADM

## 2018-12-04 RX ORDER — OXYTOCIN/RINGER'S LACTATE 20/1000 ML
2 PLASTIC BAG, INJECTION (ML) INTRAVENOUS CONTINUOUS
Status: DISCONTINUED | OUTPATIENT
Start: 2018-12-04 | End: 2018-12-04

## 2018-12-04 RX ORDER — METHYLERGONOVINE MALEATE 0.2 MG/ML
200 INJECTION INTRAVENOUS
Status: DISCONTINUED | OUTPATIENT
Start: 2018-12-04 | End: 2018-12-06 | Stop reason: HOSPADM

## 2018-12-04 RX ORDER — OXYTOCIN/RINGER'S LACTATE 20/1000 ML
10 PLASTIC BAG, INJECTION (ML) INTRAVENOUS ONCE
Status: CANCELLED | OUTPATIENT
Start: 2018-12-04 | End: 2018-12-04

## 2018-12-04 RX ORDER — DOCUSATE SODIUM 100 MG/1
200 CAPSULE, LIQUID FILLED ORAL 2 TIMES DAILY PRN
Status: DISCONTINUED | OUTPATIENT
Start: 2018-12-04 | End: 2018-12-06 | Stop reason: HOSPADM

## 2018-12-04 RX ADMIN — IBUPROFEN 600 MG: 600 TABLET ORAL at 11:12

## 2018-12-04 RX ADMIN — SODIUM CHLORIDE, SODIUM LACTATE, POTASSIUM CHLORIDE, AND CALCIUM CHLORIDE: .6; .31; .03; .02 INJECTION, SOLUTION INTRAVENOUS at 08:12

## 2018-12-04 RX ADMIN — SODIUM CHLORIDE, SODIUM LACTATE, POTASSIUM CHLORIDE, AND CALCIUM CHLORIDE 1000 ML: .6; .31; .03; .02 INJECTION, SOLUTION INTRAVENOUS at 09:12

## 2018-12-04 RX ADMIN — Medication 2 MILLI-UNITS/MIN: at 08:12

## 2018-12-04 NOTE — ANESTHESIA PROCEDURE NOTES
Epidural    Patient location during procedure: OB   Reason for block: primary anesthetic   Diagnosis: Intrauterine Pregnancy   Start time: 12/4/2018 8:45 AM  Timeout: 12/4/2018 8:45 AM  End time: 12/4/2018 8:50 AM  Staffing  Anesthesiologist: Susie Drew MD  Resident/CRNA: Telly Leger MD  Other anesthesia staff: Trinity Lazaro MD  Performed: resident/CRNA   Preanesthetic Checklist  Completed: patient identified, site marked, surgical consent, pre-op evaluation, timeout performed, IV checked, risks and benefits discussed, monitors and equipment checked, anesthesia consent given, hand hygiene performed and patient being monitored  Preparation  Patient position: sitting  Prep: ChloraPrep  Patient monitoring: ECG, Pulse Ox and Blood Pressure  Epidural  Skin Anesthetic: lidocaine 1%  Skin Wheal: 3 mL  Administration type: continuous  Approach: midline  Interspace: L3-4  Injection technique: MARYSE air and MARYSE saline  Needle and Epidural Catheter  Needle type: Tuohy   Needle gauge: 17  Needle length: 3.5 inches  Needle insertion depth: 5 cm  Catheter type: [employer]  Catheter size: 19 G  Catheter at skin depth: 10 cm  Additional Documentation: negative aspiration for heme and CSF, no paresthesia on injection, no significant complaints from patient, no signs/symptoms of IV or SA injection and no significant pain on injection  Needle localization: anatomical landmarks  Assessment  Upper dermatomal levels - Left: T9  Right: T12   Dermatomal levels determined by ice  Ease of block: moderate  Patient's tolerance of the procedure: comfortable throughout block  Post dural Puncture Headache?: No

## 2018-12-04 NOTE — ED NOTES
Pt presents to the [facility] c/o abdominal pain and ctxs every 5 mins. Pt placed in assessment 2 and MD notified. Will continue to monitor.

## 2018-12-04 NOTE — PROGRESS NOTES
"LABOR NOTE    S:  Complaints: Yes - pain with contractions.  Epidural working:  not applicable      O: /64   Pulse 63   Temp 97.8 °F (36.6 °C) (Oral)   Resp 16   Ht 5' 4" (1.626 m)   Wt 68 kg (149 lb 14.6 oz)   LMP 2018   SpO2 97%   Breastfeeding? No   BMI 25.73 kg/m²       FHT: 125 Cat 1 (reassuring), moderate BTBV, +accels, no decels  CTX: q 2-3 minutes  SVE: 3/90/0, SROM clr @ 0830      ASSESSMENT:   23 y.o.  at 39w6d    FHT reassuring    Active Hospital Problems    Diagnosis  POA    *Prolonged latent phase of labor [O62.0]  Yes      Resolved Hospital Problems   No resolved problems to display.     0845 3/90/0, SROM clr pit @2mU    PLAN:    Continue Close Maternal/Fetal Monitoring  Pitocin Augmentation per protocol  Recheck 2 hours or PRN      Shruthi Cole MD   OBGYN, PGY-1      "

## 2018-12-04 NOTE — H&P
HISTORY AND PHYSICAL                                                OBSTETRICS          Subjective:       Adrianna Berger is a 23 y.o.  female with IUP at 39w6d weeks gestation who   presents complaining of contractions since 11pm. She states she has been having them every 5 mins.     This IUP is complicated by asthma, Prydeinig speaking only and ASCUS HPV+ pap .  Patient reports contractions, denies vaginal bleeding, denies LOF.   Fetal Movement: normal    Review of Systems   Constitutional: Negative for chills and fever.   Eyes: Negative for visual disturbance.   Respiratory: Negative for shortness of breath.    Cardiovascular: Negative for chest pain and palpitations.   Gastrointestinal: Negative for abdominal pain, constipation, diarrhea, nausea and vomiting.   Genitourinary: Negative for vaginal bleeding, vaginal discharge and urinary incontinence.   Musculoskeletal: Negative for back pain.   Neurological: Negative for seizures, syncope and headaches.   Hematological: Negative for adenopathy. Does not bruise/bleed easily.   Psychiatric/Behavioral: Negative for depression. The patient is not nervous/anxious.      PMHx:   Past Medical History:   Diagnosis Date    Asthma        PSHx:   Past Surgical History:   Procedure Laterality Date    NO PAST SURGERIES         All:   Review of patient's allergies indicates:   Allergen Reactions    Eggs [egg derived] Rash       Meds:   (Not in a hospital admission)    SH:   Social History     Socioeconomic History    Marital status:      Spouse name: Not on file    Number of children: Not on file    Years of education: Not on file    Highest education level: Not on file   Social Needs    Financial resource strain: Not on file    Food insecurity - worry: Not on file    Food insecurity - inability: Not on file    Transportation needs - medical: Not on file    Transportation needs - non-medical: Not on file   Occupational History    Not on file  "  Tobacco Use    Smoking status: Never Smoker    Smokeless tobacco: Never Used   Substance and Sexual Activity    Alcohol use: Yes     Comment: pre preganancy    Drug use: No    Sexual activity: Yes     Partners: Male     Birth control/protection: None   Other Topics Concern    Not on file   Social History Narrative    Not on file       FH:   Family History   Problem Relation Age of Onset    Breast cancer Neg Hx     Colon cancer Neg Hx     Ovarian cancer Neg Hx        OBHx:   Obstetric History       T0      L0     SAB0   TAB0   Ectopic0   Multiple0   Live Births0       # Outcome Date GA Lbr Jr/2nd Weight Sex Delivery Anes PTL Lv   1 Current                   Objective:       BP (!) 142/88   Pulse 63   Temp 97 °F (36.1 °C) (Oral)   Resp 16   Ht 5' 4" (1.626 m)   Wt 68 kg (149 lb 14.6 oz)   LMP 2018   SpO2 97%   Breastfeeding? No   BMI 25.73 kg/m²     Vitals:    18 0608 18 0613 18 0618 18 0620   BP:    (!) 142/88   Pulse: 61 60 60 63   Resp:       Temp:       TempSrc:       SpO2: 96% 97% 97%    Weight:       Height:           General:   alert, appears stated age and cooperative, no apparent distress   HENT:  normocephalic, atraumatic   Eyes:  extraocular movements and conjunctivae normal   Neck:  supple, range of motion normal, no thyromegaly   Lungs:   no respiratory distress   Heart:   regular rate   Abdomen:  soft, non-tender; bowel sounds normal; no masses,  no organomegaly   Extremities negative edema, negative erythema   FHT: 120, moderate BTBV, +accels, -decels;  Cat 1 (reassuring)                 TOCO: Q 3-5 minutes   Presentations: cephalic by ultrasound   Cervix:     Dilation: 3cm    Effacement: 80%    Station:  0    Consistency: firm    Position: posterior     Lab Review  Blood Type O POS  GBBS: negative  Rubella: Immune  RPR: NR  HIV: negative  HepB: negative       Assessment:       39w6d weeks gestation for induction of labor 2/2 elevated " blood pressures    Active Hospital Problems    Diagnosis  POA    *Prolonged latent phase of labor [O62.0]  Yes      Resolved Hospital Problems   No resolved problems to display.          Plan:   1. Encounter for induction of labor   Risks, benefits, alternatives and possible complications have been discussed in detail with the patient.   - Consents signed and to chart  - Admit to Labor and Delivery unit  - US: vertex position verified   - Epidural per Anesthesia  - Draw CBC, T&S  - Notify Staff  - Recheck in 2 hrs or PRN    2. Asthma  - Asymptomatic  - Albuterol PRN  - Continue to monitor    3. Transient HTN  - Asymptomatic  - BP: (102-142)/(60-88) 142/88  - PreE labs, pending  - Continue to monitor    Post-Partum Hemorrhage risk - low    Gudelia Boswell MD  OB/GYN  PGY-1    Admitted for induction of labor.  I have reviewed the resident's note,and agree with the diagnosis and management plan

## 2018-12-04 NOTE — ED PROVIDER NOTES
Encounter Date: 2018       History     Chief Complaint   Patient presents with    Abdominal Pain    Contractions     Adrianna Berger is a 23 y.o. F at 39w6d presents complaining of contractions since 11pm. She states she has been having them every 5 mins.   This IUP is complicated by asthma, Kiswahili speaking only, ASCUS HPV+ pap .  Patient reports contractions, denies vaginal bleeding, denies LOF.   Fetal Movement: normal.            Review of patient's allergies indicates:   Allergen Reactions    Eggs [egg derived] Rash     Past Medical History:   Diagnosis Date    Asthma      Past Surgical History:   Procedure Laterality Date    NO PAST SURGERIES       Family History   Problem Relation Age of Onset    Breast cancer Neg Hx     Colon cancer Neg Hx     Ovarian cancer Neg Hx      Social History     Tobacco Use    Smoking status: Never Smoker    Smokeless tobacco: Never Used   Substance Use Topics    Alcohol use: Yes     Comment: pre preganancy    Drug use: No     Review of Systems   Constitutional: Negative for chills, fatigue and fever.   HENT: Negative for congestion.    Eyes: Negative for visual disturbance.   Respiratory: Negative for cough and shortness of breath.    Cardiovascular: Negative for chest pain and palpitations.   Gastrointestinal: Negative for abdominal distention, abdominal pain, constipation, diarrhea, nausea and vomiting.   Genitourinary: Negative for difficulty urinating, dysuria, hematuria, vaginal bleeding and vaginal discharge.   Skin: Negative for rash.   Neurological: Negative for dizziness, seizures, light-headedness and headaches.   Hematological: Does not bruise/bleed easily.   Psychiatric/Behavioral: Negative for dysphoric mood. The patient is not nervous/anxious.        Physical Exam     Initial Vitals   BP Pulse Resp Temp SpO2   18 0515 18 0513 18 0518 18 0518 18 0513   117/73 69 16 97 °F (36.1 °C) 99 %      MAP       --                 Physical Exam    Vitals reviewed.  Constitutional: She appears well-developed and well-nourished. She is not diaphoretic. No distress.   HENT:   Head: Normocephalic and atraumatic.   Nose: Nose normal.   Eyes: Conjunctivae are normal. No scleral icterus.   Cardiovascular: Normal rate, regular rhythm and intact distal pulses.   Pulmonary/Chest: Breath sounds normal. No respiratory distress.   Abdominal: Soft. She exhibits no distension. There is no tenderness.   Gravid; size = dates; EFW: 7.5 lbs   Musculoskeletal: She exhibits no edema or tenderness.   Neurological: She is alert and oriented to person, place, and time. She has normal reflexes.   Skin: Skin is warm and dry. Capillary refill takes less than 2 seconds. No rash noted.   Psychiatric: She has a normal mood and affect. Her behavior is normal. Judgment and thought content normal.     OB LABOR EXAM:   Pre-Term Labor: No.   Membranes ruptured: No.   Method: Sterile vaginal exam per MD.   Vaginal Bleeding: none present.     Dilatation: 2.   Station: -2.   Effacement: 70%.   Amniotic Fluid Color: no fluid.     Comments: Recheck: 06:30 a.m. Cervix: 3/80/0 soft, posterior; fetal head is very low; cephalic by sutures; pelvis feels adequate       ED Course   Obtain Fetal nonstress test (NST)  Date/Time: 12/4/2018 5:34 AM  Performed by: Florecita Sanchez MD  Authorized by: Florecita Sanchez MD     Nonstress Test:     Variability:  6-25 BPM    Decelerations:  None    Accelerations:  15 bpm    Baseline:  120    Contractions:  Regular    Contraction Frequency:  3-5  Biophysical Profile:     Nonstress Test Interpretation: reactive      Overall Impression:  Reassuring        Labs Reviewed   CBC W/ AUTO DIFFERENTIAL   COMPREHENSIVE METABOLIC PANEL   PROTEIN / CREATININE RATIO, URINE   POCT URINALYSIS, DIPSTICK OR TABLET REAGENT, AUTOMATED, WITH MICROSCOP   TYPE & SCREEN          Imaging Results    None          Medical Decision Making:   History:   Old Medical Records: I  decided to obtain old medical records.  Old Records Summarized: records from clinic visits.       <> Summary of Records: PRenatal records reviewed; transfer of care from [facility].  Patient was treated for Chlamydia in the 2nd trimester; negative GC/CT in November. GBS negative.   Clinical Tests:   Lab Tests: Ordered  Medical Tests: Ordered and Reviewed  ED Management:  - Cervix 2/70/-3, has made cervical change since last night  - 1 hr recheck : 3/80/0 soft, posterior (reach behind the fetal head)  - new onset transient hypertension - possibly due to pain; but trace proteinuria at term - will evaluate for preeclampsia  - ADMIT to L&D and augment labor as needed.               Attending Attestation:   Physician Attestation Statement for Resident:  As the supervising MD   Physician Attestation Statement: I have personally seen and examined this patient.   I agree with the above history. -:   As the supervising MD I agree with the above PE.    As the supervising MD I agree with the above treatment, course, plan, and disposition.  I was personally present during the critical portions of the procedure(s) performed by the resident and was immediately available in the ED to provide services and assistance as needed during the entire procedure.  I have reviewed the following: old records at this facility.                       Clinical Impression:   The primary encounter diagnosis was Prolonged latent phase of labor. Diagnoses of Pregnancy with 39 completed weeks gestation and Transient hypertension of pregnancy in third trimester were also pertinent to this visit.                             Siobhan Anthony MD  12/04/18 0637

## 2018-12-04 NOTE — PROGRESS NOTES
"LABOR NOTE    S:  Complaints: Yes - pain with contractions.  Epidural working:  yes      O: /76   Pulse 78   Temp 98.4 °F (36.9 °C) (Oral)   Resp 16   Ht 5' 4" (1.626 m)   Wt 68 kg (149 lb 14.6 oz)   LMP 2018   SpO2 98%   Breastfeeding? No   BMI 25.73 kg/m²       FHT: 125 Cat 1 (reassuring), moderate BTBV, +accels, early decelerations with contractions  CTX: q 2-3 minutes  SVE: 10/100/0      ASSESSMENT:   23 y.o.  at 39w6d    FHT reassuring    Active Hospital Problems    Diagnosis  POA    *Prolonged latent phase of labor [O62.0]  Yes      Resolved Hospital Problems   No resolved problems to display.     0845 3/90/0, SROM clr pit @2mU  1045 /0, Pit @ 2mU  1245 /0, pit @ 2mU  1445 ant lip/0, pit @ 8mU  1600 ant lip/0, pit @ 8mU  1800 10/100/0, will set up to push    PLAN:    Continue Close Maternal/Fetal Monitoring  Pitocin Augmentation per protocol  Recheck 2 hours or PRN      Shruthi Cole MD   OBGYN, PGY-1      "

## 2018-12-04 NOTE — PROGRESS NOTES
"LABOR NOTE    S:  Complaints: Yes - pain with contractions.  Epidural working:  yes      O: /62   Pulse 76   Temp 97.8 °F (36.6 °C) (Oral)   Resp 16   Ht 5' 4" (1.626 m)   Wt 68 kg (149 lb 14.6 oz)   LMP 2018   SpO2 100%   Breastfeeding? No   BMI 25.73 kg/m²       FHT: 125 Cat 1 (reassuring), moderate BTBV, +accels, no decels  CTX: q 2-3 minutes  SVE:       ASSESSMENT:   23 y.o.  at 39w6d    FHT reassuring    Active Hospital Problems    Diagnosis  POA    *Prolonged latent phase of labor [O62.0]  Yes      Resolved Hospital Problems   No resolved problems to display.     0845 3/90/0, SROM clr pit @2mU  1045 , Pit @ 2mU  1245 , pit @ 2mU    PLAN:    Continue Close Maternal/Fetal Monitoring  Pitocin Augmentation per protocol  Recheck 2 hours or PRN      Shruthi Cole MD   OBGYN, PGY-1      "

## 2018-12-04 NOTE — PROGRESS NOTES
"LABOR NOTE    S:  Complaints: Yes - pain with contractions.  Epidural working:  not applicable      O: /63   Pulse 63   Temp 97.8 °F (36.6 °C) (Oral)   Resp 16   Ht 5' 4" (1.626 m)   Wt 68 kg (149 lb 14.6 oz)   LMP 2018   SpO2 100%   Breastfeeding? No   BMI 25.73 kg/m²       FHT: 125 Cat 1 (reassuring), moderate BTBV, +accels, no decels  CTX: q 2-3 minutes  SVE:       ASSESSMENT:   23 y.o.  at 39w6d    FHT reassuring    Active Hospital Problems    Diagnosis  POA    *Prolonged latent phase of labor [O62.0]  Yes      Resolved Hospital Problems   No resolved problems to display.     0845 3/90/0, SROM clr pit @2mU  1045 , Pit @ 2mU    PLAN:    Continue Close Maternal/Fetal Monitoring  Pitocin Augmentation per protocol  Recheck 2 hours or PRN      Shruthi Cole MD   OBN, PGY-1      "

## 2018-12-04 NOTE — ANESTHESIA PREPROCEDURE EVALUATION
[facility]  Anesthesia Obstetric Evaluation         Patient Name: Adrianna Berger  YOB: 1995  MRN: [id number]    SUBJECTIVE:          2018    Adrianna Berger is a 23 y.o. female  with IUP at 39w6d and PMHx of Bronchial [place] on Pulmicort Inhaler, presenting with contractions q5 minutes since late last night. Denies vaginal bleeding, loss of fetal movement.     No prior surgical procedures  Denies toxic habits    LDA:       Peripheral IV - Single Lumen 18 0640 Right Hand (Active)   Site Assessment Clean;Dry;Intact 2018  6:45 AM   Line Status Saline locked 2018  6:45 AM   Dressing Status Clean;Dry;Intact;Biopatch in place 2018  6:45 AM   Dressing Intervention New dressing 2018  6:45 AM   Number of days: 0        lactated ringers      lactated ringers      oxytocin      oxytocin      oxytocin         Patient Active Problem List   Diagnosis    Encounter for supervision of normal first pregnancy in third trimester/flu/tdap    ASCUS of cervix with negative high risk HPV - needs pap pp    Prolonged latent phase of labor       Review of patient's allergies indicates:   Allergen Reactions    Eggs [egg derived] Rash       Current Inpatient Medications:      No current facility-administered medications on file prior to encounter.      Current Outpatient Medications on File Prior to Encounter   Medication Sig Dispense Refill    iron bis-gly/FA/C/B12/Ca/succ (IRON 21/7 ORAL) Take by mouth.      prenatal vit,calc76/iron/folic (PNV 29-1 ORAL) Take by mouth.         Past Surgical History:   Procedure Laterality Date    NO PAST SURGERIES         Social History     Socioeconomic History    Marital status:      Spouse name: Not on file    Number of children: Not on file    Years of education: Not on file    Highest education level: Not on file   Social Needs    Financial resource strain: Not on file     Food insecurity - worry: Not on file    Food insecurity - inability: Not on file    Transportation needs - medical: Not on file    Transportation needs - non-medical: Not on file   Occupational History    Not on file   Tobacco Use    Smoking status: Never Smoker    Smokeless tobacco: Never Used   Substance and Sexual Activity    Alcohol use: Yes     Comment: pre preganancy    Drug use: No    Sexual activity: Yes     Partners: Male     Birth control/protection: None   Other Topics Concern    Not on file   Social History Narrative    Not on file       OBJECTIVE:     Vital Signs Range (Last 24H):  Temp:  [36.1 °C (97 °F)-36.6 °C (97.9 °F)]   Pulse:  [57-74]   Resp:  [16-18]   BP: (102-142)/(60-88)   SpO2:  [94 %-100 %]       Significant Labs:  Lab Results   Component Value Date    WBC 16.62 (H) 12/04/2018    HGB 13.6 12/04/2018    HCT 40.8 12/04/2018     12/04/2018    ALT 19 12/04/2018    AST 19 12/04/2018     12/04/2018    K 4.2 12/04/2018     12/04/2018    CREATININE 0.7 12/04/2018    BUN 14 12/04/2018    CO2 20 (L) 12/04/2018       Diagnostic Studies:    ==============  Repeat ultrasound study as clinically indicated.  Thank you again for allowing us to participate in the care of your patients. If you have any questions concerning today's consultation, feel free to contact me or one of my  partners. We can be reached at [phone] during normal business hours. If you have a question after normal business hours, please contact Labor and Delivery at  [phone].    EKG: No recent studies available.    2D ECHO:  No results found for this or any previous visit.      ASSESSMENT/PLAN:         Anesthesia Evaluation         Review of Systems      Physical Exam  General:  Well nourished    Airway/Jaw/Neck:  Airway Findings: Mouth Opening: Normal Tongue: Normal  General Airway Assessment: Adult  Mallampati: I  Improves to I with phonation.  TM Distance: Normal, at least 6 cm      Eyes/Ears/Nose:  Eyes/Ears/Nose Findings:    Dental:  Dental Findings: In tact   Chest/Lungs:  Chest/Lungs Clear    Heart/Vascular:  Heart Findings: Normal       Mental Status:  Mental Status Findings:  Cooperative, Alert and Oriented         Anesthesia Plan  Type of Anesthesia, risks & benefits discussed:  Anesthesia Type:  epidural, CSE, general  Patient's Preference:   Intra-op Monitoring Plan: standard ASA monitors  Intra-op Monitoring Plan Comments:   Post Op Pain Control Plan: multimodal analgesia, IV/PO Opioids PRN and per primary service following discharge from PACU  Post Op Pain Control Plan Comments:   Induction:   IV  Beta Blocker:  Patient is not currently on a Beta-Blocker (No further documentation required).       Informed Consent: Patient understands risks and agrees with Anesthesia plan.  Questions answered. Anesthesia consent signed with patient.  ASA Score: 2     Day of Surgery Review of History & Physical:            Ready For Surgery From Anesthesia Perspective.

## 2018-12-04 NOTE — PROGRESS NOTES
"LABOR NOTE    S:  Complaints: Yes - pain with contractions.  Epidural working:  yes      O: /76   Pulse 78   Temp 98.4 °F (36.9 °C) (Oral)   Resp 16   Ht 5' 4" (1.626 m)   Wt 68 kg (149 lb 14.6 oz)   LMP 2018   SpO2 98%   Breastfeeding? No   BMI 25.73 kg/m²       FHT: 125 Cat 1 (reassuring), moderate BTBV, +accels, no decels  CTX: q 2-3 minutes  SVE: anterior lip/0      ASSESSMENT:   23 y.o.  at 39w6d    FHT reassuring    Active Hospital Problems    Diagnosis  POA    *Prolonged latent phase of labor [O62.0]  Yes      Resolved Hospital Problems   No resolved problems to display.     0845 3/0, SROM clr pit @2mU  1045 4/90/0, Pit @ 2mU  1245 8//0, pit @ 2mU  1445 ant lip/0, pit @ 8mU    PLAN:    Continue Close Maternal/Fetal Monitoring  Pitocin Augmentation per protocol  Recheck 2 hours or PRN      Shruthi Cole MD   OBGYN, PGY-1      "

## 2018-12-04 NOTE — PROGRESS NOTES
"LABOR NOTE    S:  Complaints: Yes - pain with contractions.  Epidural working:  yes      O: /76   Pulse 78   Temp 98.4 °F (36.9 °C) (Oral)   Resp 16   Ht 5' 4" (1.626 m)   Wt 68 kg (149 lb 14.6 oz)   LMP 2018   SpO2 98%   Breastfeeding? No   BMI 25.73 kg/m²       FHT: 125 Cat 1 (reassuring), moderate BTBV, +accels, early decelerations with contractions  CTX: q 2-3 minutes  SVE: anterior lip/0      ASSESSMENT:   23 y.o.  at 39w6d    FHT reassuring    Active Hospital Problems    Diagnosis  POA    *Prolonged latent phase of labor [O62.0]  Yes      Resolved Hospital Problems   No resolved problems to display.     0845 3/90/0, SROM clr pit @2mU  1045 4//0, Pit @ 2mU  1245 8//0, pit @ 2mU  1445 ant lip/0, pit @ 8mU  1600 ant lip/0, pit @ 8mU    PLAN:    Continue Close Maternal/Fetal Monitoring  Pitocin Augmentation per protocol  Recheck 2 hours or PRN      Shruthi Cole MD   OBGYN, PGY-1      "

## 2018-12-05 PROBLEM — O13.3 TRANSIENT HYPERTENSION OF PREGNANCY IN THIRD TRIMESTER: Status: ACTIVE | Noted: 2018-12-05

## 2018-12-05 LAB
BASOPHILS # BLD AUTO: 0.01 K/UL
BASOPHILS NFR BLD: 0.1 %
DIFFERENTIAL METHOD: ABNORMAL
EOSINOPHIL # BLD AUTO: 0.2 K/UL
EOSINOPHIL NFR BLD: 1.1 %
ERYTHROCYTE [DISTWIDTH] IN BLOOD BY AUTOMATED COUNT: 13.5 %
HCT VFR BLD AUTO: 35.5 %
HGB BLD-MCNC: 11.8 G/DL
LYMPHOCYTES # BLD AUTO: 2.5 K/UL
LYMPHOCYTES NFR BLD: 14.6 %
MCH RBC QN AUTO: 31.1 PG
MCHC RBC AUTO-ENTMCNC: 33.2 G/DL
MCV RBC AUTO: 94 FL
MONOCYTES # BLD AUTO: 1.1 K/UL
MONOCYTES NFR BLD: 6.3 %
NEUTROPHILS # BLD AUTO: 13.4 K/UL
NEUTROPHILS NFR BLD: 77.5 %
PLATELET # BLD AUTO: 189 K/UL
PMV BLD AUTO: 10.5 FL
RBC # BLD AUTO: 3.79 M/UL
WBC # BLD AUTO: 17.27 K/UL

## 2018-12-05 PROCEDURE — 25000003 PHARM REV CODE 250: Performed by: STUDENT IN AN ORGANIZED HEALTH CARE EDUCATION/TRAINING PROGRAM

## 2018-12-05 PROCEDURE — 72100003 HC LABOR CARE, EA. ADDL. 8 HRS

## 2018-12-05 PROCEDURE — 11000001 HC ACUTE MED/SURG PRIVATE ROOM

## 2018-12-05 PROCEDURE — 36415 COLL VENOUS BLD VENIPUNCTURE: CPT

## 2018-12-05 PROCEDURE — 99232 SBSQ HOSP IP/OBS MODERATE 35: CPT | Mod: ,,, | Performed by: OBSTETRICS & GYNECOLOGY

## 2018-12-05 PROCEDURE — 85025 COMPLETE CBC W/AUTO DIFF WBC: CPT

## 2018-12-05 PROCEDURE — 72100002 HC LABOR CARE, 1ST 8 HOURS

## 2018-12-05 RX ORDER — IBUPROFEN 600 MG/1
600 TABLET ORAL EVERY 6 HOURS PRN
Qty: 30 TABLET | Refills: 1 | Status: SHIPPED | OUTPATIENT
Start: 2018-12-05

## 2018-12-05 RX ADMIN — IBUPROFEN 600 MG: 600 TABLET ORAL at 10:12

## 2018-12-05 RX ADMIN — IBUPROFEN 600 MG: 600 TABLET ORAL at 06:12

## 2018-12-05 RX ADMIN — IBUPROFEN 600 MG: 600 TABLET ORAL at 12:12

## 2018-12-05 RX ADMIN — DOCUSATE SODIUM 200 MG: 100 CAPSULE, LIQUID FILLED ORAL at 09:12

## 2018-12-05 NOTE — PROGRESS NOTES
1100:  After speaking with the patient via the [facility]/ (#[id number]). Pt states that after voiding she feels as if there is residual urine left in her bladder. RN encourage pt to void and to let RN know when she has voided. RN will bladder scan pt after next void. Fundus is deviated to the right at this time. Pt's bleeding is stable. Will continue to monitor.     1300:  At this time pt voided 300 cc. Pt still states that she feels as if she has residual urine left in her bladder. Fundus is still deviated to the right at this time. Bladder scan after void showed that pt has >999 cc left in her bladder at this time. Orders to in/out cath pt.     1330:   Pt was catheterized and the bladder began to drain at this time. A total of 1,200 cc was emptied from the pt's bladder slowly over a time period of 30 minutes. Pt's fundus is now firm and midline. Bleeding is still stable. Orders for pt to void by at least 8 pm. Will continue to monitor.

## 2018-12-05 NOTE — PLAN OF CARE
Problem: Breastfeeding (Adult,Obstetrics,Pediatric)  Goal: Signs and Symptoms of Listed Potential Problems Will be Absent, Minimized or Managed (Breastfeeding)  Signs and symptoms of listed potential problems will be absent, minimized or managed by discharge/transition of care (reference Breastfeeding (Adult,Obstetrics,Pediatric) CPG).  Outcome: Ongoing (interventions implemented as appropriate)   12/05/18 1314   Breastfeeding   Problems Assessed (Breastfeeding) all   Problems Present (Breastfeeding) other (see comments)   Follow basic education

## 2018-12-05 NOTE — L&D DELIVERY NOTE
[facility]  Vaginal Delivery   Operative Note    SUMMARY     Normal spontaneous vaginal delivery of live infant, was placed on mothers abdomen for skin to skin and bulb suctioning performed.  Infant delivered position OA over intact perineum.  Nuchal cord: Yes, cord reduced at perineum.    Spontaneous delivery of placenta and IV pitocin given noting good uterine tone.  2nd degree laceration noted and repaired in normal fashion with 2-0 vicryl.  Patient tolerated delivery well. Sponge needle and lap counted correctly x2.    Indications: Prolonged latent phase of labor  Pregnancy complicated by:   Patient Active Problem List   Diagnosis    Encounter for supervision of normal first pregnancy in third trimester/flu/tdap    ASCUS of cervix with negative high risk HPV - needs pap pp     (spontaneous vaginal delivery)    Prolonged latent phase of labor     Admitting GA: 39w6d    Delivery Information for  Omar Berger    Birth information:  YOB: 2018   Time of birth: 6:31 PM   Sex: male   Head Delivery Date/Time: 2018  6:31 PM   Delivery type: Vaginal, Spontaneous   Gestational Age: 39w6d    Delivery Providers    Delivering clinician:  Evan Isbell Jr., MD   Provider Role    MD Netta Alvarado MD Toki W Carter RN     LEILANI Dailey RN             Measurements    Weight:  3550 g  Length:  55.2 cm  Head circumference:  36.2 cm  Chest circumference:  34.9 cm         Apgars    Living status:  Living  Apgars:   1 min.:   5 min.:   10 min.:   15 min.:   20 min.:     Skin color:   1  1       Heart rate:   2  2       Reflex irritability:   2  2       Muscle tone:   2  2       Respiratory effort:   2  2       Total:   9  9       Apgars assigned by:  LEILANI JAMA         Operative Delivery    Forceps attempted?:  No  Vacuum extractor attempted?:  No         Shoulder Dystocia    Shoulder dystocia present?:  No            Presentation    Presentation:  Vertex  Position:  Occiput Anterior           Interventions/Resuscitation    Method:  Bulb Suctioning, Tactile Stimulation       Cord    Vessels:  3 vessels  Complications:  Nuchal  Nuchal Intervention:  reduced  Nuchal Cord Description:  loose nuchal cord  Number of Loops:  1  Delayed Cord Clamping?:  No  Cord Blood Disposition:  Sent with Baby  Gases Sent?:  Yes  Stem Cell Collection (by MD):  No       Placenta    Placenta delivery date/time:  2018 1834  Placenta removal:  Spontaneous  Placenta appearance:  Intact  Placenta disposition:  discarded           Labor Events:       labor: No     Labor Onset Date/Time:         Dilation Complete Date/Time:         Start Pushing Date/Time:       Rupture Date/Time:              Rupture type:           Fluid Amount:        Fluid Color:        Fluid Odor:        Membrane Status (PeriCalm): SRM (Spontaneous Rupture)      Rupture Date/Time (PeriCalm): 2018 08:30:00      Fluid Amount (PeriCalm): Small      Fluid Color (PeriCalm): Clear       steroids: None     Antibiotics given for GBS: No     Induction:       Indications for induction:        Augmentation:       Indications for augmentation:       Labor complications: None     Additional complications:          Cervical ripening:                     Delivery:      Episiotomy: None     Indication for Episiotomy:       Perineal Lacerations: 2nd Repaired:      Periurethral Laceration: none Repaired:     Labial Laceration: none Repaired:     Sulcus Laceration: none Repaired:     Vaginal Laceration: No Repaired:     Cervical Laceration: No Repaired:     Repair suture:       Repair # of packets: 3     Vaginal delivery QBL (mL): 100      QBL (mL): 0     Combined Blood Loss (mL): 100     Vaginal Sweep Performed: Yes     Surgicount Correct:         Other providers:       Anesthesia    Method:  Epidural          Details (if applicable):  Trial of Labor       Categorization:      Priority:     Indications for :     Incision Type:       Additional  information:  Forceps:    Vacuum:    Breech:    Observed anomalies    Other (Comments):           Evan Isbell MD

## 2018-12-05 NOTE — DISCHARGE SUMMARY
POSTPARTUM PROGRESS NOTE     Adrianna Berger is a 23 y.o.  female PPD #1 status post Spontaneous vaginal delivery at 40w2d in a pregnancy complicated by ACUS/HPV+ and asthma. Patient is doing well this morning. She denies nausea, vomiting, fever or chills. Has not been eating as much given patient was tired from delivery last night.   Patient reports mild abdominal pain that is well relieved by oral pain medications. Lochia is mild and decreasing. Patient is voiding without difficulty and ambulating with no difficulty. She has not passed flatus, and has not had BM.  Patient does plan to breast feed. Defer to post partum visit with Dr. Pedraza for contraception - considering . She desires circumcision - consents completed this AM.       Objective:       Temp:  [96.2 °F (35.7 °C)-98.5 °F (36.9 °C)] 98.5 °F (36.9 °C)  Pulse:  [56-94] 72  Resp:  [16] 16  SpO2:  [95 %-100 %] 99 %  BP: (100-138)/(55-84) 120/73    General:   alert, appears stated age and cooperative   Lungs:   clear to auscultation bilaterally   Heart:   regular rate and rhythm, S1, S2 normal, no murmur, click, rub or gallop   Abdomen:  soft, non-tender; bowel sounds normal; no masses,  no organomegaly   Uterus:  firm located at the umblicus.    Extremities: peripheral pulses normal, no pedal edema, no clubbing or cyanosis     Lab Review  No results found for this or any previous visit (from the past 4 hour(s)).    I/O    Intake/Output Summary (Last 24 hours) at 2018 0624  Last data filed at 2018 0120  Gross per 24 hour   Intake --   Output 300 ml   Net -300 ml        Assessment:     Patient Active Problem List   Diagnosis    Encounter for supervision of normal first pregnancy in third trimester/flu/tdap    ASCUS of cervix with negative high risk HPV - needs pap pp     (spontaneous vaginal delivery)    Transient hypertension of pregnancy in third trimester        Plan:   1. Postpartum care:  - Patient doing well. Continue routine  management and advances.  - Continue PO pain meds. Pain well controlled.  - Heme: H/h: 13.6/40.8>p  - Encourage ambulation  - Circumcision - consents signed this AM  - Contraception - defer to post partum visit with Dr. Pedraza, considering Nexplanon  - Lactation consult PRN    2. HSV2  - Stable  - Continue to monitor    3. ASCUS/HPV+  - Needs colposcopy post partum    4. Asthma  - Stable  - Continue to monitor      Dispo: As patient meets milestones, will plan to discharge PPD#1-2    Gudelia Boswell MD  OB/GYN  PGY-1

## 2018-12-05 NOTE — LACTATION NOTE
12/05/18 1015   Maternal Infant Assessment   Breast Density Bilateral:;soft   Areola Bilateral:;elastic   Nipple(s) Bilateral:;everted  (semi flat, short.)   Infant Assessment   Mouth Size small   Sucking Reflex present   Rooting Reflex present   Swallow Reflex present   LATCH Score   Latch 1-->repeated attempts, holds nipple in mouth, stimulate to suck   Audible Swallowing 1-->a few with stimulation   Type Of Nipple 2-->everted (after stimulation)   Comfort (Breast/Nipple) 2-->soft/nontender   Hold (Positioning) 1-->minimal assist, teach one side: mother does other, staff holds   Score (less than 7 for 2/more consecutive times, consult Lactation Consultant) 7   Breasts WDL   Breasts WDL WDL   Pain/Comfort Assessments   Pain Assessment Performed Yes       Number Scale   Presence of Pain denies   Location nipple(s)   Pain Rating: Rest 0   Pain Rating: Activity 0   Maternal Infant Feeding   Maternal Emotional State assist needed;relaxed   Infant Positioning cross-cradle   Signs of Milk Transfer infant jaw motion present   Presence of Pain no   Comfort Measures Before/During Feeding infant position adjusted;latch adjusted;maternal position adjusted   Time Spent (min) 30-60 min   Latch Assistance yes   Breastfeeding Education adequate infant intake;adequate milk volume;diet;increasing milk supply;milk expression, hand   Feeding Infant   Effective Latch During Feeding yes   Skin-to-Skin Contact During Feeding yes   Lactation Referrals   Lactation Consult Breastfeeding assessment   Lactation Interventions   Attachment Promotion breastfeeding assistance provided   Breastfeeding Assistance assisted with positioning;feeding cue recognition promoted;feeding on demand promoted;feeding session observed;infant latch-on verified;infant suck/swallow verified   Maternal Breastfeeding Support diary/feeding log utilized;encouragement offered;infant-mother separation minimized;lactation counseling provided;maternal hydration  promoted;maternal nutrition promoted;maternal rest encouraged   Latch Promotion positioning assisted   basic education reviewed. Latch assistance provided. Infant cross cradle hold., skin to skin . Latch with good tugs and pulls.

## 2018-12-05 NOTE — LACTATION NOTE
12/05/18 1015   Maternal Infant Assessment   Breast Density Bilateral:;soft   Areola Bilateral:;elastic   Nipple(s) Bilateral:;everted  (semi flat, short.)   Infant Assessment   Mouth Size small   Sucking Reflex present   Rooting Reflex present   Swallow Reflex present   LATCH Score   Latch 1-->repeated attempts, holds nipple in mouth, stimulate to suck   Audible Swallowing 1-->a few with stimulation   Type Of Nipple 2-->everted (after stimulation)   Comfort (Breast/Nipple) 2-->soft/nontender   Hold (Positioning) 1-->minimal assist, teach one side: mother does other, staff holds   Score (less than 7 for 2/more consecutive times, consult Lactation Consultant) 7   Breasts WDL   Breasts WDL WDL   Pain/Comfort Assessments   Pain Assessment Performed Yes       Number Scale   Presence of Pain denies   Location nipple(s)   Pain Rating: Rest 0   Pain Rating: Activity 0   Maternal Infant Feeding   Maternal Emotional State assist needed;relaxed   Infant Positioning cross-cradle   Signs of Milk Transfer infant jaw motion present   Presence of Pain no   Comfort Measures Before/During Feeding infant position adjusted;latch adjusted;maternal position adjusted   Time Spent (min) 30-60 min   Latch Assistance yes   Breastfeeding Education adequate infant intake;adequate milk volume;diet;increasing milk supply;milk expression, hand   Feeding Infant   Effective Latch During Feeding yes   Skin-to-Skin Contact During Feeding yes   Lactation Referrals   Lactation Consult Breastfeeding assessment   Lactation Interventions   Attachment Promotion breastfeeding assistance provided   Breastfeeding Assistance assisted with positioning;feeding cue recognition promoted;feeding on demand promoted;feeding session observed;infant latch-on verified;infant suck/swallow verified   Maternal Breastfeeding Support diary/feeding log utilized;encouragement offered;infant-mother separation minimized;lactation counseling provided;maternal hydration  promoted;maternal nutrition promoted;maternal rest encouraged   Latch Promotion positioning assisted

## 2018-12-05 NOTE — ANESTHESIA POSTPROCEDURE EVALUATION
"Anesthesia Post Evaluation    Patient: Adrianna Berger    Procedure(s) Performed: * No procedures listed *    Final Anesthesia Type: epidural  Patient location during evaluation: labor & delivery  Patient participation: Yes- Able to Participate  Level of consciousness: awake and alert and oriented  Post-procedure vital signs: reviewed and stable  Pain management: adequate  Airway patency: patent  PONV status at discharge: No PONV  Anesthetic complications: no      Cardiovascular status: hemodynamically stable  Respiratory status: unassisted  Hydration status: euvolemic  Follow-up not needed.        Visit Vitals  BP (!) 112/56   Pulse 75   Temp 36.4 °C (97.5 °F) (Oral)   Resp 18   Ht 5' 4" (1.626 m)   Wt 68 kg (149 lb 14.6 oz)   LMP 02/22/2018   SpO2 95%   Breastfeeding? Yes   BMI 25.73 kg/m²       Pain/Hortencia Score: Pain Rating Prior to Med Admin: 6 (12/5/2018 12:00 PM)  Pain Rating Post Med Admin: 4 (12/5/2018 12:30 AM)        "

## 2018-12-05 NOTE — DISCHARGE INSTRUCTIONS
Breastfeeding Discharge Instructions       Feed the baby at the earliest sign of hunger or comfort  o Hands to mouth, sucking motions  o Rooting or searching for something to suck on  o Dont wait for crying - it is a sign of distress     The feedings may be 8-12 times per 24hrs and will not follow a schedule   Avoid pacifiers and bottles for the first 4 weeks   Alternate the breast you start the feeding with, or start with the breast that feels the fullest   Switch breasts when the baby takes himself off the breast or falls asleep   Keep offering breasts until the baby looks full, no longer gives hunger signs, and stays asleep when placed on his back in the crib   If the baby is sleepy and wont wake for a feeding, put the baby skin-to-skin dressed in a diaper against the mothers bare chest   Sleep near your baby   The baby should be positioned and latched on to the breast correctly  o Chest-to-chest, chin in the breast  o Babys lips are flipped outward  o Babys mouth is stretched open wide like a shout  o Babys sucking should feel like tugging to the mother  - The baby should be drinking at the breast:  o You should hear swallowing or gulping throughout the feeding  o You should see milk on the babys lips when he comes off the breast  o Your breasts should be softer when the baby is finished feeding  o The baby should look relaxed at the end of feedings  o After the 4th day and your milk is in:  o The babys poop should turn bright yellow and be loose, watery, and seedy  o The baby should have at least 3-4 poops the size of the palm of your hand per day  o The baby should have at least 5-6 wet diapers per day  o The urine should be light yellow in color  You should drink when you are thirsty and eat a healthy diet when you are    hungry.     Take naps to get the rest you need.   Take medications and/or drink alcohol only with permission of your obstetrician    or the babys pediatrician.  You can  also call the Infant Risk Center,   ([phone]), Monday-Friday, 8am-5pm Central time, to get the most   up-to-date evidence-based information on the use of medications during   pregnancy and breastfeeding.      The baby should be examined by a pediatrician at 3-5 days of age.  Once your   milk comes in, the baby should be gaining at least ½ - 1oz each day and should be back to birthweight no later than 10-14 days of age.          Community Resources    [facility] Breastfeeding Warmline: [phone]   Local [facility] clinics: provide incentives and breastpumps to eligible mothers  [employer] ([employer]LI):  mother-to-mother support group website        www.[employer]  Local La Leche League mother-to-mother support groups:        www.[employer]        La Leche League [facility]   Dr. Aristides Sellers website for latch videos and general information:        www.breastfeedinginc.ca  [facility]ant Risk Center is a call center that provides information about the safety of taking medications while breastfeeding.  Call [phone], M-F, 8am-5pm, [place].  International Lactation Consultant Association provides resources for assistance:        www.ilca.org  [place] Breastfeeding Coalition provides informationand resources for parents  and the community    www.LaBreastfeedingSupport.org     Olga Lidia Fenton is a mom-to-mom support group:                             www.[employer].com//breastfeedng-support/  [employer] for Healthy Babies:  0-023-949-BABY(2439)  [employer]: a breastfeeding support group for women of color, [phone]

## 2018-12-06 VITALS
HEART RATE: 68 BPM | TEMPERATURE: 99 F | DIASTOLIC BLOOD PRESSURE: 70 MMHG | HEIGHT: 64 IN | WEIGHT: 149.94 LBS | RESPIRATION RATE: 18 BRPM | SYSTOLIC BLOOD PRESSURE: 116 MMHG | BODY MASS INDEX: 25.6 KG/M2 | OXYGEN SATURATION: 98 %

## 2018-12-06 PROCEDURE — 25000003 PHARM REV CODE 250: Performed by: STUDENT IN AN ORGANIZED HEALTH CARE EDUCATION/TRAINING PROGRAM

## 2018-12-06 PROCEDURE — 99238 HOSP IP/OBS DSCHRG MGMT 30/<: CPT | Mod: ,,, | Performed by: OBSTETRICS & GYNECOLOGY

## 2018-12-06 RX ADMIN — IBUPROFEN 600 MG: 600 TABLET ORAL at 07:12

## 2018-12-06 NOTE — PROGRESS NOTES
POSTPARTUM PROGRESS NOTE     Adrianna Berger is a 23 y.o.  female PPD #2 status post Spontaneous vaginal delivery at 40w2d in a pregnancy complicated by ACUS/HPV+ and asthma. Patient is doing well this morning. She denies nausea, vomiting, fever or chills.  Patient reports mild abdominal pain that is well relieved by oral pain medications. Lochia is mild and decreasing. Patient is voiding without difficulty and ambulating with no difficulty. She has passed flatus, and has not had BM.  Patient does plan to breast feed. Defer to post partum visit with Dr. Pedraza for contraception - considering . She desires circumcision.     Objective:       Temp:  [97.5 °F (36.4 °C)-98.2 °F (36.8 °C)] 98.2 °F (36.8 °C)  Pulse:  [62-75] 62  Resp:  [18] 18  SpO2:  [95 %-97 %] 97 %  BP: (109-112)/(56-69) 110/69    General:   alert, appears stated age and cooperative   Lungs:   clear to auscultation bilaterally   Heart:   regular rate and rhythm, S1, S2 normal, no murmur, click, rub or gallop   Abdomen:  soft, non-tender; bowel sounds normal; no masses,  no organomegaly   Uterus:  firm located at the umblicus.    Extremities: peripheral pulses normal, no pedal edema, no clubbing or cyanosis     Lab Review  No results found for this or any previous visit (from the past 4 hour(s)).    I/O    Intake/Output Summary (Last 24 hours) at 2018 0631  Last data filed at 2018 1800  Gross per 24 hour   Intake --   Output 950 ml   Net -950 ml        Assessment:     Patient Active Problem List   Diagnosis    Encounter for supervision of normal first pregnancy in third trimester/flu/tdap    ASCUS of cervix with negative high risk HPV - needs pap pp     (spontaneous vaginal delivery)    Transient hypertension of pregnancy in third trimester        Plan:   1. Postpartum care:  - Patient doing well. Continue routine management and advances.  - Continue PO pain meds. Pain well controlled.  - Heme: H/h: 13.6/40.8> 11.8/35.5  - Encourage  ambulation  - Circumcision - consents reviewed and signed  - Contraception - defer to post partum visit with Dr. Pedraza, considering Nexplanon  - Lactation consult PRN    2. HSV2:  - Stable  - Continue to monitor    3. ASCUS/HPV+:  - Needs colposcopy post partum    4. Asthma:  - Stable  - Continue to monitor      Dispo: As patient meets milestones, will plan to discharge today.    Michela Echevarria MD  OBGYN, PGY-1

## 2018-12-06 NOTE — DISCHARGE SUMMARY
Delivery Discharge Summary  Obstetrics      Primary OB Clinician: [facility]    Admission date: 2018  Discharge date: 2018    Disposition: To home, self care    Discharge Diagnosis List:      Patient Active Problem List   Diagnosis    Encounter for supervision of normal first pregnancy in third trimester/flu/tdap    ASCUS of cervix with negative high risk HPV - needs pap pp     (spontaneous vaginal delivery)    Transient hypertension of pregnancy in third trimester       Procedure:     Hospital Course:  Adrianna Berger is a 23 y.o. now , PPD #2 who was admitted on 2018 at 39w6d for normal labor. Patient was subsequently admitted to labor and delivery unit with signed consents. Labor course was uncomplicated and resulted in  without complications.     Please see delivery note for further details. Her postpartum course was uncomplicated. On discharge day, patient's pain is controlled with oral pain medications. Pt is tolerating ambulation without SOB or CP, and regular diet without N/V. Reports lochia is mild. Denies any HA, vision changes, F/C, LE swelling. Denies any breast pain/soreness.    Pt in stable condition and ready for discharge. She has been instructed to start and/or continue medications and follow up with her obstetrics provider as listed below.    Pertinent studies:  Postpartum CBC  Lab Results   Component Value Date    WBC 17.27 (H) 2018    HGB 11.8 (L) 2018    HCT 35.5 (L) 2018    MCV 94 2018     2018       There is no immunization history for the selected administration types on file for this patient.     Delivery:    Episiotomy: None   Lacerations: 2nd   Repair suture:     Repair # of packets: 3   Blood loss (ml): 100     Birth information:  YOB: 2018   Time of birth: 6:31 PM   Sex: male   Delivery type: Vaginal, Spontaneous   Gestational Age: 39w6d    Delivery Clinician:      Other providers:       Additional   information:  Forceps:    Vacuum:    Breech:    Observed anomalies      Living?:           APGARS  One minute Five minutes Ten minutes   Skin color:         Heart rate:         Grimace:         Muscle tone:         Breathing:         Totals: 9  9        Placenta: Delivered:       appearance      Patient Instructions:   Current Discharge Medication List      START taking these medications    Details   ibuprofen (ADVIL,MOTRIN) 600 MG tablet Take 1 tablet (600 mg total) by mouth every 6 (six) hours as needed for Pain (cramping).  Qty: 30 tablet, Refills: 1         CONTINUE these medications which have NOT CHANGED    Details   iron bis-gly/FA/C/B12/Ca/succ (IRON 21/7 ORAL) Take by mouth.      prenatal vit,calc76/iron/folic (PNV 29-1 ORAL) Take by mouth.             Discharge Procedure Orders   Diet Adult Regular     Notify your health care provider if you experience any of the following:   Order Comments: Heavy vaginal bleeding saturating more than 1 pad per hr for at least consecutive 2 hrs.     Notify your health care provider if you experience any of the following:  increased confusion or weakness     Notify your health care provider if you experience any of the following:  persistent dizziness, light-headedness, or visual disturbances     Notify your health care provider if you experience any of the following:  severe persistent headache     Notify your health care provider if you experience any of the following:  difficulty breathing or increased cough     Notify your health care provider if you experience any of the following:  severe uncontrolled pain     Notify your health care provider if you experience any of the following:  persistent nausea and vomiting or diarrhea     Notify your health care provider if you experience any of the following:  temperature >100.4     Activity as tolerated   Order Comments: Pelvic rest until follow up visit. Nothing in vagina -no sex, tampons, douching, etc.       Follow-up  Information     [facility]. Schedule an appointment as soon as possible for a visit in 1 week.    Specialty:  Obstetrics and Gynecology  Why:  blood pressure check  Contact information:  [address] [facility] [address] [phone]           [facility]. Schedule an appointment as soon as possible for a visit in 6 weeks.    Specialty:  Obstetrics and Gynecology  Why:  post-partum visit  Contact information:  [address] [facility] [address] [phone]                  Michela Echevarria MD  OBGYN, PGY-1

## 2018-12-06 NOTE — PLAN OF CARE
Problem: Patient Care Overview  Goal: Plan of Care Review  Outcome: Outcome(s) achieved Date Met: 12/06/18  LACTATION NOTE:  Mother will nurse baby on cue till content; will achieve deep latch onto breast tissue and observe for signs of milk transfer; will apply lanolin to nipples pc; will monitor baby's 24hr diaper counts; will call Warmline prn.

## 2018-12-06 NOTE — LACTATION NOTE
"   12/06/18 0900   Maternal Infant Assessment   Breast Density Bilateral:;soft   Areola Bilateral:;elastic   Nipple(s) Bilateral:;graspable;everted   Nipple Symptoms bilateral:;tender;redness   Infant Assessment   Sucking Reflex present   Rooting Reflex present   Swallow Reflex present   LATCH Score   Latch 2-->grasps breast, tongue down, lips flanged, rhythmic sucking   Audible Swallowing 2-->spontaneous and intermittent (24 hrs old)   Type Of Nipple 2-->everted (after stimulation)   Comfort (Breast/Nipple) 1-->filling, red/small blisters/bruises, mild/mod discomfort   Hold (Positioning) 2-->no assist from staff, mother able to position/hold infant   Score (less than 7 for 2/more consecutive times, consult Lactation Consultant) 9   Pain/Comfort Assessments   Pain Assessment Performed Yes       Number Scale   Presence of Pain complains of pain/discomfort   Location nipple(s)   Pain Rating: Activity 4  (initial latch on score is 6)   Factors that Relieve Pain (correct latch,lanolin pc applied to nipples)   Maternal Infant Feeding   Maternal Emotional State independent;relaxed   Infant Positioning clutch/"football"   Signs of Milk Transfer infant jaw motion present   Time Spent (min) 15-30 min   Nipple Shape After Feeding, Right (round, elongated)   Latch Assistance no   Feeding Infant   Feeding Readiness Cues hand to mouth movements;rooting   Effective Latch During Feeding yes   Suck/Swallow Coordination present   Lactation Referrals   Lactation Consult Breastfeeding assessment;Follow up;Knowledge deficit   Lactation Interventions   Attachment Promotion counseling provided;family involvement promoted;privacy provided;skin-to-skin contact encouraged;rooming-in promoted   Breast Care: Breastfeeding milk massaged towards nipple;lanolin to nipple(s) applied   Breastfeeding Assistance feeding cue recognition promoted;feeding on demand promoted;feeding session observed;infant latch-on verified;infant suck/swallow " verified;support offered   Latch Promotion suck stimulated with colostrum drop

## 2018-12-06 NOTE — PROGRESS NOTES
POSTPARTUM PROGRESS NOTE     Adrianna Berger is a 23 y.o.  female PPD #1 status post Spontaneous vaginal delivery at 40w2d in a pregnancy complicated by ACUS/HPV+ and asthma. Patient is doing well this morning. She denies nausea, vomiting, fever or chills. Has not been eating as much given patient was tired from delivery last night.   Patient reports mild abdominal pain that is well relieved by oral pain medications. Lochia is mild and decreasing. Patient is voiding without difficulty and ambulating with no difficulty. She has not passed flatus, and has not had BM.  Patient does plan to breast feed. Defer to post partum visit with Dr. Pedraza for contraception - considering . She desires circumcision - consents completed this AM.       Objective:       Temp:  [97.5 °F (36.4 °C)-98.2 °F (36.8 °C)] 98.2 °F (36.8 °C)  Pulse:  [62-75] 62  Resp:  [18] 18  SpO2:  [95 %-97 %] 97 %  BP: (109-112)/(56-69) 110/69    General:   alert, appears stated age and cooperative   Lungs:   clear to auscultation bilaterally   Heart:   regular rate and rhythm, S1, S2 normal, no murmur, click, rub or gallop   Abdomen:  soft, non-tender; bowel sounds normal; no masses,  no organomegaly   Uterus:  firm located at the umblicus.    Extremities: peripheral pulses normal, no pedal edema, no clubbing or cyanosis     Lab Review  No results found for this or any previous visit (from the past 4 hour(s)).    I/O    Intake/Output Summary (Last 24 hours) at 2018 0632  Last data filed at 2018 1800  Gross per 24 hour   Intake --   Output 950 ml   Net -950 ml        Assessment:     Patient Active Problem List   Diagnosis    Encounter for supervision of normal first pregnancy in third trimester/flu/tdap    ASCUS of cervix with negative high risk HPV - needs pap pp     (spontaneous vaginal delivery)    Transient hypertension of pregnancy in third trimester        Plan:   1. Postpartum care:  - Patient doing well. Continue routine  management and advances.  - Continue PO pain meds. Pain well controlled.  - Heme: H/h: 13.6/40.8>p  - Encourage ambulation  - Circumcision - consents signed this AM  - Contraception - defer to post partum visit with Dr. Pedraza, considering Nexplanon  - Lactation consult PRN    2. HSV2  - Stable  - Continue to monitor    3. ASCUS/HPV+  - Needs colposcopy post partum    4. Asthma  - Stable  - Continue to monitor      Dispo: As patient meets milestones, will plan to discharge PPD#1-2    Gudelia Boswell MD  OB/GYN  PGY-1

## 2018-12-13 ENCOUNTER — TELEPHONE (OUTPATIENT)
Dept: OBSTETRICS AND GYNECOLOGY | Facility: CLINIC | Age: 23
End: 2018-12-13

## 2018-12-13 ENCOUNTER — POSTPARTUM VISIT (OUTPATIENT)
Dept: OBSTETRICS AND GYNECOLOGY | Facility: CLINIC | Age: 23
End: 2018-12-13
Payer: MEDICAID

## 2018-12-13 VITALS
BODY MASS INDEX: 22.89 KG/M2 | HEIGHT: 64 IN | DIASTOLIC BLOOD PRESSURE: 72 MMHG | SYSTOLIC BLOOD PRESSURE: 120 MMHG | WEIGHT: 134.06 LBS

## 2018-12-13 DIAGNOSIS — O13.3 TRANSIENT HYPERTENSION OF PREGNANCY IN THIRD TRIMESTER: Primary | ICD-10-CM

## 2018-12-13 PROCEDURE — 99213 OFFICE O/P EST LOW 20 MIN: CPT | Mod: PBBFAC,TH,PO | Performed by: STUDENT IN AN ORGANIZED HEALTH CARE EDUCATION/TRAINING PROGRAM

## 2018-12-13 PROCEDURE — 99999 PR PBB SHADOW E&M-EST. PATIENT-LVL III: CPT | Mod: PBBFAC,,,

## 2018-12-13 NOTE — PROGRESS NOTES
"Chief Complaint   Patient presents with    Blood Pressure Check     PP       HPI:  23 y.o. female  presents for blood pressure check s/p  1 week ago. Patient had transient HTN during hospital admission. Patient is overall doing well and desires Nexplanon for contraception.     Patient's last menstrual period was 2018.    Past Medical History:   Diagnosis Date    Asthma     Transient hypertension of pregnancy in third trimester 2018     Past Surgical History:   Procedure Laterality Date    NO PAST SURGERIES         Social History     Tobacco Use    Smoking status: Never Smoker    Smokeless tobacco: Never Used   Substance Use Topics    Alcohol use: Yes     Comment: pre preganancy     Family History   Problem Relation Age of Onset    Breast cancer Neg Hx     Colon cancer Neg Hx     Ovarian cancer Neg Hx      OB History    Para Term  AB Living   1 1 1     1   SAB TAB Ectopic Multiple Live Births         0 1      # Outcome Date GA Lbr Jr/2nd Weight Sex Delivery Anes PTL Lv   1 Term 18 39w6d  3.55 kg (7 lb 13.2 oz) M Vag-Spont EPI N JOELLE          MEDICATIONS: Reviewed with patient.  ALLERGIES: Eggs [egg derived]     ROS:  Review of Systems   Constitutional: Negative for fever.   Respiratory: Negative for shortness of breath.    Cardiovascular: Negative for chest pain.   Gastrointestinal: Negative for abdominal pain, nausea and vomiting.   Genitourinary: Negative for menstrual problem and pelvic pain.   Neurological: Negative for headaches.       PHYSICAL EXAM:    /72   Ht 5' 4" (1.626 m)   Wt 60.8 kg (134 lb 0.6 oz)   LMP 2018   Breastfeeding? Yes   BMI 23.01 kg/m²     Physical Exam:   Constitutional: She is oriented to person, place, and time. She appears well-developed and well-nourished. No distress.    HENT:   Head: Normocephalic and atraumatic.      Cardiovascular: Normal rate and regular rhythm.     Pulmonary/Chest: Effort normal. No respiratory " distress.        Abdominal: She exhibits no mass. There is no tenderness. There is no guarding.             Musculoskeletal: She exhibits no edema or tenderness.       Neurological: She is alert and oriented to person, place, and time.    Skin: No rash noted. She is not diaphoretic. No erythema.    Psychiatric: She has a normal mood and affect. Her behavior is normal. Judgment and thought content normal.         ASSESSMENT & PLAN:   Transient hypertension of pregnancy in third trimester      - Overall doing well  - /72  - Nexplanon paperwork signed, scheduled with Dr. Garcia on 12/24 for placement      Michela Echevarria MD  OBGYN, PGY-1

## 2018-12-21 ENCOUNTER — TELEPHONE (OUTPATIENT)
Dept: OBSTETRICS AND GYNECOLOGY | Facility: CLINIC | Age: 23
End: 2018-12-21

## 2018-12-21 NOTE — TELEPHONE ENCOUNTER
Called pt's  concerning appt for Nexplanon insertion. Pt stated that she has changed her mind and does not want the Nexplanon anymore. Notified Dr Garcia's staff. Cancelled appt.

## 2019-01-03 ENCOUNTER — TELEPHONE (OUTPATIENT)
Dept: OBSTETRICS AND GYNECOLOGY | Facility: CLINIC | Age: 24
End: 2019-01-03

## 2019-01-03 NOTE — TELEPHONE ENCOUNTER
----- Message from Aimee Ascencio LPN sent at 12/21/2018  3:17 PM CST -----  Pt changed her mind about the Nexplanon. Pt wants to know if she needs to speak with you concerning another form of birth control.

## 2019-01-23 ENCOUNTER — POSTPARTUM VISIT (OUTPATIENT)
Dept: OBSTETRICS AND GYNECOLOGY | Facility: CLINIC | Age: 24
End: 2019-01-23
Payer: MEDICAID

## 2019-01-23 VITALS
WEIGHT: 127.88 LBS | HEIGHT: 64 IN | DIASTOLIC BLOOD PRESSURE: 60 MMHG | SYSTOLIC BLOOD PRESSURE: 100 MMHG | BODY MASS INDEX: 21.83 KG/M2

## 2019-01-23 DIAGNOSIS — Z30.9 ENCOUNTER FOR CONTRACEPTIVE MANAGEMENT, UNSPECIFIED TYPE: ICD-10-CM

## 2019-01-23 DIAGNOSIS — Z30.013 ENCOUNTER FOR INITIAL PRESCRIPTION OF INJECTABLE CONTRACEPTIVE: ICD-10-CM

## 2019-01-23 PROBLEM — Z34.03 ENCOUNTER FOR SUPERVISION OF NORMAL FIRST PREGNANCY IN THIRD TRIMESTER: Status: RESOLVED | Noted: 2018-10-23 | Resolved: 2019-01-23

## 2019-01-23 PROBLEM — O13.3 TRANSIENT HYPERTENSION OF PREGNANCY IN THIRD TRIMESTER: Status: RESOLVED | Noted: 2018-12-05 | Resolved: 2019-01-23

## 2019-01-23 LAB
B-HCG UR QL: NEGATIVE
CTP QC/QA: YES

## 2019-01-23 PROCEDURE — 99999 PR PBB SHADOW E&M-EST. PATIENT-LVL II: CPT | Mod: PBBFAC,,, | Performed by: OBSTETRICS & GYNECOLOGY

## 2019-01-23 PROCEDURE — 59430 PR CARE AFTER DELIVERY ONLY: ICD-10-PCS | Mod: S$PBB,,, | Performed by: OBSTETRICS & GYNECOLOGY

## 2019-01-23 PROCEDURE — 99999 PR PBB SHADOW E&M-EST. PATIENT-LVL II: ICD-10-PCS | Mod: PBBFAC,,, | Performed by: OBSTETRICS & GYNECOLOGY

## 2019-01-23 PROCEDURE — 99212 OFFICE O/P EST SF 10 MIN: CPT | Mod: PBBFAC,TH,PO | Performed by: OBSTETRICS & GYNECOLOGY

## 2019-01-23 PROCEDURE — 81025 URINE PREGNANCY TEST: CPT | Mod: PBBFAC,PO | Performed by: OBSTETRICS & GYNECOLOGY

## 2019-01-23 RX ORDER — MEDROXYPROGESTERONE ACETATE 150 MG/ML
150 INJECTION, SUSPENSION INTRAMUSCULAR
Qty: 1 ML | Refills: 4 | Status: SHIPPED | OUTPATIENT
Start: 2019-01-23 | End: 2020-04-03

## 2019-01-23 NOTE — PROGRESS NOTES
Adrianna Berger received IM Depo Provera to the left upper outer side of the deltoid on 1/23/2019.    The patient was instructed to get the next injection on 04/10-4/24.    Lot Number: [id number]  Expiration Date: 09/2020  BY TIMOTHY ARANDA

## 2019-01-23 NOTE — PROGRESS NOTES
Adrianna Berger is a 23 y.o. female  presents for a postpartum visit.  She is status post  6 weeks ago.  Her hospitalization was not complicated.  She is breastfeeding.  She desires Depo-Provera for contraception.  She denies postpartum depression.    Her last pap was ascus/hpv+    Past Medical History:   Diagnosis Date    Asthma     Transient hypertension of pregnancy in third trimester 2018     Past Surgical History:   Procedure Laterality Date    NO PAST SURGERIES       Review of patient's allergies indicates:   Allergen Reactions    Eggs [egg derived] Rash       Current Outpatient Medications:     prenatal vit,calc76/iron/folic (PNV 29-1 ORAL), Take by mouth., Disp: , Rfl:     ibuprofen (ADVIL,MOTRIN) 600 MG tablet, Take 1 tablet (600 mg total) by mouth every 6 (six) hours as needed for Pain (cramping)., Disp: 30 tablet, Rfl: 1    iron bis-gly/FA/C/B12/Ca/succ (IRON 21/7 ORAL), Take by mouth., Disp: , Rfl:     medroxyPROGESTERone (DEPO-PROVERA) 150 mg/mL Syrg, Inject 1 mL (150 mg total) into the muscle every 3 (three) months for 4 doses, Disp: 1 mL, Rfl: 4      Vitals:    19 0924   BP: 100/60       GENERAL: no distress  ABDOMEN: no masses, hepatosplenomegaly, no hernias  EXTERNAL GENITALIA POSTPARTUM: normal, well-healed, without lesions or masses   VAGINA POSTPARTUM: normal, well-healed, physiologic discharge, without lesions   CERVIX POSTPARTUM: normal, well-healed, without lesions   UTERUS POSTPARTUM: normal size, well involuted, firm, non-tender, ADNEXA POSTPARTUM: no masses palpable and nontender    Assessment:  Normal postpartum exam    Plan:  Routine follow up.  Pap in 3 months

## 2019-04-08 ENCOUNTER — TELEPHONE (OUTPATIENT)
Dept: OBSTETRICS AND GYNECOLOGY | Facility: CLINIC | Age: 24
End: 2019-04-08

## 2019-04-08 NOTE — TELEPHONE ENCOUNTER
----- Message from Jacque Villanueva sent at 4/8/2019  9:46 AM CDT -----  Contact: Pt Spouse Yaya   Name of Who is Calling: Pt Spouse Yaya     What is the request in detail: Pt Spouse Yaya states his wife is suppose to see the Dr for a follow up visit per the Dr. Please call to further discuss and advise. Pt also states she is due for depo around 4/10-4/24. Pt spouse states she was told after the baby she need to be seen in April. Please advise.     Can the clinic reply by [facility]: No     What Number to Call Back [facility] not [facility]: Pt Spouse Yaya # [phone]

## 2019-04-23 ENCOUNTER — DOCUMENTATION ONLY (OUTPATIENT)
Dept: PHARMACY | Facility: CLINIC | Age: 24
End: 2019-04-23

## 2019-04-23 NOTE — PROGRESS NOTES
Patient received IM Depo Provera to the upper outer side of left deltoid on 04/23/2019     The patient was instructed to get the next injection on 07/09-07/23/2019 .     Lot Number: [id number]  Expiration Date: 12/2020  BY ROSA ARANDA

## 2019-10-07 NOTE — PROGRESS NOTES
Adrianna Berger received IM Depo Provera to the left upper outer side of the deltoid on 10/7/2019.    The patient was instructed to get the next injection on 12/23-1/6.    Lot Number: [id number]  Expiration Date: 07/2021  BY TIMOTHY ARANDA

## 2020-01-16 NOTE — PROGRESS NOTES
Adrianna Berger received IM Depo Provera to the left upper outer side of the deltoid on 1/6/2020    The patient was instructed to get the next injection on 3/26-4/7.    Lot Number: [id number]  Expiration Date: 7/2021  BY DORI ARANDA

## 2020-04-03 ENCOUNTER — TELEPHONE (OUTPATIENT)
Dept: OBSTETRICS AND GYNECOLOGY | Facility: CLINIC | Age: 25
End: 2020-04-03

## 2020-04-03 RX ORDER — MEDROXYPROGESTERONE ACETATE 150 MG/ML
150 INJECTION, SUSPENSION INTRAMUSCULAR
Qty: 1 ML | Refills: 4 | Status: SHIPPED | OUTPATIENT
Start: 2020-04-03 | End: 2021-06-14 | Stop reason: SDUPTHER

## 2020-04-03 NOTE — TELEPHONE ENCOUNTER
Called pts  to inform that refill has been sent to the pharmacy and pt can go to get shot. Yaya said thanks.

## 2020-04-03 NOTE — TELEPHONE ENCOUNTER
Called pts  to inform that request has been routed to the MD script may not get refilled on today but pt can check on Monday window for injection is 03/26-04/07. Pts  verbalized understanding.

## 2020-04-03 NOTE — TELEPHONE ENCOUNTER
----- Message from Rosemary Thurman sent at 4/3/2020  2:25 PM CDT -----  Contact: Yaya Ohara ()  Who called:Yaya Ohara ()    What is the request in detail: Patient's anton is requesting a call back. He states his wife received issues getting her depo shot at the pharmacy. He states she does not speak the best English and he would like further clarification on the situation. He would also like to know if she needs an appointment to receive the shot. He states he was advised that she is out of refills for medroxyPROGESTERone (DEPO-PROVERA) 150 mg/mL Syrg, however, he was under the impression that she had 4 refills left until 4/5. He states she would like to receive her shot today.   Please advise.    Can the clinic reply by [facility]? No    Best call back number: [phone]    Additional Information: N/A

## 2020-07-01 NOTE — PROGRESS NOTES
Adrianna Berger received IM Depo Provera to the left upper outer side of the deltoid on 7/1/2020.    The patient was instructed to get the next injection on 9/16-9/30.    Lot Number: [id number]  Expiration Date: 07/2021  BY TIMOTHY ARANDA

## 2020-09-28 ENCOUNTER — DOCUMENTATION ONLY (OUTPATIENT)
Dept: PHARMACY | Facility: CLINIC | Age: 25
End: 2020-09-28

## 2020-09-28 NOTE — PROGRESS NOTES
Patient received IM Depo Provera to the upper outer side of left arm on 09/28/2020      The patient was instructed to get the next injection between 12/14-12/28/2020 .     Lot Number: [id number]  Expiration Date: 06/2022  BY Lico SALGUERO

## 2020-12-28 ENCOUNTER — DOCUMENTATION ONLY (OUTPATIENT)
Dept: PHARMACY | Facility: CLINIC | Age: 25
End: 2020-12-28

## 2020-12-28 NOTE — PROGRESS NOTES
Patient received IM Depo Provera to the upper outer side of left arm on 12/28/20     The patient was instructed to get the next injection between 03/15-03-29/21     Lot Number: [id number]  Expiration Date: 06/22  BY Nanda Lazaro

## 2021-03-18 ENCOUNTER — OFFICE VISIT (OUTPATIENT)
Dept: URGENT CARE | Facility: CLINIC | Age: 26
End: 2021-03-18

## 2021-03-18 ENCOUNTER — DOCUMENTATION ONLY (OUTPATIENT)
Dept: PHARMACY | Facility: CLINIC | Age: 26
End: 2021-03-18

## 2021-03-18 DIAGNOSIS — Z20.822 ENCOUNTER FOR LABORATORY TESTING FOR COVID-19 VIRUS: ICD-10-CM

## 2021-03-18 PROCEDURE — U0005 INFEC AGEN DETEC AMPLI PROBE: HCPCS | Performed by: FAMILY MEDICINE

## 2021-03-18 PROCEDURE — 99214 OFFICE O/P EST MOD 30 MIN: CPT | Mod: TIER,S$GLB,, | Performed by: FAMILY MEDICINE

## 2021-03-18 PROCEDURE — U0003 INFECTIOUS AGENT DETECTION BY NUCLEIC ACID (DNA OR RNA); SEVERE ACUTE RESPIRATORY SYNDROME CORONAVIRUS 2 (SARS-COV-2) (CORONAVIRUS DISEASE [COVID-19]), AMPLIFIED PROBE TECHNIQUE, MAKING USE OF HIGH THROUGHPUT TECHNOLOGIES AS DESCRIBED BY CMS-2020-01-R: HCPCS | Performed by: FAMILY MEDICINE

## 2021-03-18 PROCEDURE — 99214 PR OFFICE/OUTPT VISIT, EST, LEVL IV, 30-39 MIN: ICD-10-PCS | Mod: TIER,S$GLB,, | Performed by: FAMILY MEDICINE

## 2021-03-19 ENCOUNTER — TELEPHONE (OUTPATIENT)
Dept: URGENT CARE | Facility: CLINIC | Age: 26
End: 2021-03-19

## 2021-03-19 LAB — SARS-COV-2 RNA RESP QL NAA+PROBE: NOT DETECTED

## 2021-04-28 ENCOUNTER — PATIENT MESSAGE (OUTPATIENT)
Dept: RESEARCH | Facility: HOSPITAL | Age: 26
End: 2021-04-28

## 2021-06-17 ENCOUNTER — CLINICAL SUPPORT (OUTPATIENT)
Dept: OBSTETRICS AND GYNECOLOGY | Facility: CLINIC | Age: 26
End: 2021-06-17

## 2021-06-17 DIAGNOSIS — Z30.42 ENCOUNTER FOR DEPO-PROVERA CONTRACEPTION: Primary | ICD-10-CM

## 2021-06-17 PROCEDURE — 99999 PR PBB SHADOW E&M-EST. PATIENT-LVL II: ICD-10-PCS | Mod: PBBFAC,,,

## 2021-06-17 PROCEDURE — 99999 PR PBB SHADOW E&M-EST. PATIENT-LVL II: CPT | Mod: PBBFAC,,,

## 2021-06-17 PROCEDURE — 96372 THER/PROPH/DIAG INJ SC/IM: CPT | Mod: PBBFAC,PN

## 2021-06-17 RX ORDER — MEDROXYPROGESTERONE ACETATE 150 MG/ML
150 INJECTION, SUSPENSION INTRAMUSCULAR
Status: COMPLETED | OUTPATIENT
Start: 2021-06-17 | End: 2021-06-17

## 2021-06-17 RX ADMIN — MEDROXYPROGESTERONE ACETATE 150 MG: 150 INJECTION, SUSPENSION INTRAMUSCULAR at 10:06

## 2021-06-18 RX ORDER — MEDROXYPROGESTERONE ACETATE 150 MG/ML
150 INJECTION, SUSPENSION INTRAMUSCULAR ONCE
Qty: 1 ML | Refills: 0 | Status: SHIPPED | OUTPATIENT
Start: 2021-06-18 | End: 2021-09-14 | Stop reason: SDUPTHER

## 2021-09-14 ENCOUNTER — TELEPHONE (OUTPATIENT)
Dept: OBSTETRICS AND GYNECOLOGY | Facility: CLINIC | Age: 26
End: 2021-09-14

## 2021-09-14 RX ORDER — MEDROXYPROGESTERONE ACETATE 150 MG/ML
150 INJECTION, SUSPENSION INTRAMUSCULAR ONCE
Qty: 1 ML | Refills: 0 | Status: SHIPPED | OUTPATIENT
Start: 2021-09-14 | End: 2021-12-03 | Stop reason: SDUPTHER

## 2021-12-03 ENCOUNTER — PATIENT MESSAGE (OUTPATIENT)
Dept: OBSTETRICS AND GYNECOLOGY | Facility: CLINIC | Age: 26
End: 2021-12-03

## 2022-02-08 ENCOUNTER — OFFICE VISIT (OUTPATIENT)
Dept: OBSTETRICS AND GYNECOLOGY | Facility: CLINIC | Age: 27
End: 2022-02-08
Attending: OBSTETRICS & GYNECOLOGY

## 2022-02-08 VITALS
DIASTOLIC BLOOD PRESSURE: 82 MMHG | SYSTOLIC BLOOD PRESSURE: 116 MMHG | WEIGHT: 142.88 LBS | HEIGHT: 65 IN | BODY MASS INDEX: 23.81 KG/M2

## 2022-02-08 DIAGNOSIS — Z01.419 WELL WOMAN EXAM: Primary | ICD-10-CM

## 2022-02-08 PROCEDURE — 99213 OFFICE O/P EST LOW 20 MIN: CPT | Mod: PBBFAC | Performed by: OBSTETRICS & GYNECOLOGY

## 2022-02-08 PROCEDURE — 99999 PR PBB SHADOW E&M-EST. PATIENT-LVL III: ICD-10-PCS | Mod: PBBFAC,,, | Performed by: OBSTETRICS & GYNECOLOGY

## 2022-02-08 PROCEDURE — 99385 PR PREVENTIVE VISIT,NEW,18-39: ICD-10-PCS | Mod: S$PBB,,, | Performed by: OBSTETRICS & GYNECOLOGY

## 2022-02-08 PROCEDURE — 99999 PR PBB SHADOW E&M-EST. PATIENT-LVL III: CPT | Mod: PBBFAC,,, | Performed by: OBSTETRICS & GYNECOLOGY

## 2022-02-08 PROCEDURE — 88142 CYTOPATH C/V THIN LAYER: CPT | Performed by: STUDENT IN AN ORGANIZED HEALTH CARE EDUCATION/TRAINING PROGRAM

## 2022-02-08 PROCEDURE — 99385 PREV VISIT NEW AGE 18-39: CPT | Mod: S$PBB,,, | Performed by: OBSTETRICS & GYNECOLOGY

## 2022-02-08 RX ORDER — CETIRIZINE HYDROCHLORIDE 10 MG/1
10 TABLET ORAL DAILY
COMMUNITY

## 2022-02-08 RX ORDER — MEDROXYPROGESTERONE ACETATE 150 MG/ML
150 INJECTION, SUSPENSION INTRAMUSCULAR
Qty: 1 ML | Refills: 3 | Status: SHIPPED | OUTPATIENT
Start: 2022-02-08 | End: 2023-02-02

## 2022-02-08 NOTE — PROGRESS NOTES
"Reason for visit: Gynecologic Exam      HPI:    Adrianna Berger is a 26 y.o. [id number] presents for annual exam. She denies any complaints today.     Denies abdominal pain. Denies constipation or diarrhea. Denies urinary symptoms, such as frequency and dysuria. Denies vaginal discharge, itching, or odor. Denies breast pain, masses, and discharge. Amenorrheic due to depo. No intermenstrual or postcoital bleeding. No family history of breast, ovarian, uterine or colon cancer. Currently sexually active with male partners, reports 1 partners/12 months; uses depo for contraception. Denies history of STDs in the past; declines STD screening today. Lives at home with partner and child, feels safe at home.    Routine health screenings:  Pap: needs  MMG: n/a  Colonoscopy: n/a  DEXA: not indicated    Reviewed:    Pap: 2022, per chart review, ASCUS/HPV+ prior to 2019  Mammogram: N/A    Past medical, surgical, social, family, and obstetric history: Reviewed and updated in EMR.  Medications: Reviewed and updated in EMR.  Allergies: Eggs [egg derived]    Prior records and results: Reviewed  Outside records: Available records reviewed    Review of Systems   Constitutional: Negative for chills and fever.   Respiratory: Negative for cough and shortness of breath.    Cardiovascular: Negative for chest pain and palpitations.   Gastrointestinal: Negative for abdominal pain, constipation, diarrhea, nausea and vomiting.   Genitourinary: Negative for dysuria, urgency, vaginal bleeding, vaginal discharge and vaginal pain.   Musculoskeletal: Negative for arthralgias.   Integumentary:  Negative for rash.   Neurological: Negative for syncope and headaches.         Vitals: /82 (BP Location: Right arm, Patient Position: Sitting)   Ht 5' 5" (1.651 m)   Wt 64.8 kg (142 lb 13.7 oz)   LMP 2022   BMI 23.77 kg/m²     Physical Exam  Genitourinary:      Vulva normal.      Right Labia: No rash, lesions or Bartholin's cyst.     Left " Labia: No lesions, Bartholin's cyst or rash.     No vaginal discharge or bleeding.        Right Adnexa: not tender and not full.     Left Adnexa: not tender and not full.     No cervical motion tenderness or lesion.      Uterus is not enlarged or tender.      Pelvic exam was performed with patient in the lithotomy position.   Breasts: Breasts are symmetrical.      Right: No mass, nipple discharge, skin change, tenderness or axillary adenopathy.      Left: No mass, nipple discharge, skin change, tenderness or axillary adenopathy.       Lymphadenopathy:      Upper Body:      Right upper body: No axillary adenopathy.      Left upper body: No axillary adenopathy.   Exam conducted with a chaperone present.           Assessment and Plan:     Well woman exam  -     Liquid-Based Pap Smear, Screening    Other orders  -     medroxyPROGESTERone (DEPO-PROVERA) 150 mg/mL injection; Inject 1 mL (150 mg total) into the muscle every 3 (three) months.  Dispense: 1 mL; Refill: 3           Annual exam  o Breast and pelvic exam: wnl  o Patient was counseled on ASCCP guidelines for cervical cytology screening  o Cervical screening: pap collected  o Patient was counseled on current recommendations for breast cancer screening  o Mammogram screening: n/a   STD testing: declines   Contraception: depo ordered    She was counseled to follow up with her PCP for other routine health maintenance    Maddie Bahena MD/MPH  OB/GYN PGY2

## 2022-02-10 PROBLEM — R87.610 ASCUS OF CERVIX WITH NEGATIVE HIGH RISK HPV: Status: RESOLVED | Noted: 2018-10-23 | Resolved: 2022-02-10

## 2022-02-15 LAB
FINAL PATHOLOGIC DIAGNOSIS: NORMAL
Lab: NORMAL

## 2022-03-03 NOTE — PROGRESS NOTES
Adrianna Berger received IM Depo Provera to the left upper outer side of the deltoid on 3/3/2022.    The patient was instructed to get the next injection on 5/19-6/2/2022.    Lot Number: [id number]  Expiration Date: 11/2023  [facility]

## 2022-05-26 ENCOUNTER — DOCUMENTATION ONLY (OUTPATIENT)
Dept: PHARMACY | Facility: CLINIC | Age: 27
End: 2022-05-26

## 2022-05-26 NOTE — PROGRESS NOTES
Patient received medroxyprogesterone 150mg/mL on 05/26/2022  in  RIGHT ARM region   LOT [id number]   EXP 11/2023  Patient advised to receive next injection between:    08/11-08/25/2022

## 2022-08-19 ENCOUNTER — DOCUMENTATION ONLY (OUTPATIENT)
Dept: PHARMACY | Facility: CLINIC | Age: 27
End: 2022-08-19

## 2022-08-19 NOTE — PROGRESS NOTES
Patient received medroxyprogesterone 150mg/mL on  08/19/2022  In LEFT ARM    LOT [id number]  EXP 04/2024  Patient advised to receive next injection between:    11/4-11/18/2022

## 2022-11-10 NOTE — PROGRESS NOTES
Medroxyprogesterone 150 mg/mL Injection:    Administered 11/10/22 @ 10:15A in the LEFT ARM    LOT: [id number]  EXP: 7/24    Next window: 1/26/22 - 2/9/22        BY: Mariella Schafer, PharmD

## 2023-01-17 ENCOUNTER — OFFICE VISIT (OUTPATIENT)
Dept: OBSTETRICS AND GYNECOLOGY | Facility: CLINIC | Age: 28
End: 2023-01-17
Attending: OBSTETRICS & GYNECOLOGY

## 2023-01-17 VITALS — WEIGHT: 121 LBS | DIASTOLIC BLOOD PRESSURE: 70 MMHG | BODY MASS INDEX: 20.14 KG/M2 | SYSTOLIC BLOOD PRESSURE: 118 MMHG

## 2023-01-17 DIAGNOSIS — Z01.419 WELL WOMAN EXAM WITH ROUTINE GYNECOLOGICAL EXAM: Primary | ICD-10-CM

## 2023-01-17 DIAGNOSIS — Z30.42 SURVEILLANCE FOR DEPO-PROVERA CONTRACEPTION: ICD-10-CM

## 2023-01-17 PROCEDURE — 99999 PR PBB SHADOW E&M-EST. PATIENT-LVL III: ICD-10-PCS | Mod: PBBFAC,,, | Performed by: OBSTETRICS & GYNECOLOGY

## 2023-01-17 PROCEDURE — 99999 PR PBB SHADOW E&M-EST. PATIENT-LVL III: CPT | Mod: PBBFAC,,, | Performed by: OBSTETRICS & GYNECOLOGY

## 2023-01-17 PROCEDURE — 99395 PR PREVENTIVE VISIT,EST,18-39: ICD-10-PCS | Mod: S$PBB,,, | Performed by: OBSTETRICS & GYNECOLOGY

## 2023-01-17 PROCEDURE — 99213 OFFICE O/P EST LOW 20 MIN: CPT | Mod: PBBFAC,PN | Performed by: OBSTETRICS & GYNECOLOGY

## 2023-01-17 PROCEDURE — 99395 PREV VISIT EST AGE 18-39: CPT | Mod: S$PBB,,, | Performed by: OBSTETRICS & GYNECOLOGY

## 2023-01-17 NOTE — PROGRESS NOTES
Past medical, surgical, social, family, and obstetric histories; medications; prior records and results; and available outside records were reviewed and updated in the EMR.  Pertinent findings were noted below.    Reason for Visit   Well Woman    HPI   27 y.o. female     New patient: No    Menopausal: No    History of abnormal paps: DENIES  Abnormal or postmenopausal bleeding:  See below  History of abnormal mammograms:N/A   Family history of breast or ovarian cancer: DENIES  Any breast masses, pain, skin changes, or nipple discharge: DENIES  Possible recent STD exposure: DENIES  Contraception: DepoProvera    Patient reports irregular bleeding on depo provera. The past 3 months she has had multiple days of irregular spotting, stating she will bleeding approximately half of the month. Prior to this her bleeding was more infrequent. She would often go 1-2 months without bleeding then have irregular spotting. She is wondering if her dose of depo or interval between injections can be adjusted.    Pap: 2/15/2022, NILM  Mammogram: N/A  Allergies: Eggs [egg derived]    Review of Systems   Constitutional:  Negative for chills, fatigue and fever.   HENT:  Negative for nasal congestion.    Eyes:  Negative for visual disturbance.   Respiratory:  Negative for cough and shortness of breath.    Cardiovascular:  Negative for chest pain.   Gastrointestinal:  Negative for abdominal pain, constipation, diarrhea, nausea and vomiting.   Endocrine: Negative for hair loss and hot flashes.   Genitourinary:  Positive for menstrual problem and vaginal bleeding. Negative for dysuria, frequency, hematuria and vaginal discharge.   Musculoskeletal:  Negative for back pain.   Integumentary:  Negative for breast mass, nipple discharge and breast skin changes.   Neurological:  Negative for headaches.   Hematological:  Does not bruise/bleed easily.   Psychiatric/Behavioral:  Negative for depression.    Breast: Negative for mass,  mastodynia, nipple discharge and skin changes    Exam   /70   Wt 54.9 kg (121 lb)   LMP 01/16/2023   BMI 20.14 kg/m²     Physical Exam  Constitutional:       General: She is not in acute distress.     Appearance: Normal appearance.   Genitourinary:      Vulva normal.      Right Labia: No rash, lesions or Bartholin's cyst.     Left Labia: No lesions, Bartholin's cyst or rash.     Vaginal bleeding (consistent with current cycle) present.      No vaginal discharge.        Right Adnexa: not tender and not full.     Left Adnexa: not tender and not full.     No cervical motion tenderness or lesion.      Uterus is not enlarged or tender.      Pelvic exam was performed with patient in the lithotomy position.   Breasts:     Breasts are symmetrical.      Right: No mass, nipple discharge, skin change or tenderness.      Left: No mass, nipple discharge, skin change or tenderness.   HENT:      Head: Normocephalic and atraumatic.   Eyes:      Pupils: Pupils are equal, round, and reactive to light.   Cardiovascular:      Rate and Rhythm: Normal rate.   Pulmonary:      Effort: Pulmonary effort is normal.   Abdominal:      Palpations: Abdomen is soft. There is no mass.      Tenderness: There is no abdominal tenderness. There is no guarding.   Lymphadenopathy:      Upper Body:      Right upper body: No axillary adenopathy.      Left upper body: No axillary adenopathy.   Neurological:      General: No focal deficit present.      Mental Status: She is alert and oriented to person, place, and time.   Skin:     General: Skin is warm and dry.   Psychiatric:         Mood and Affect: Mood normal.         Behavior: Behavior normal.   Exam conducted with a chaperone present.     Assessment and Plan   Well woman exam with routine gynecological exam    Surveillance for Depo-Provera contraception      Annual exam  Breast and pelvic exam: benign  Patient was counseled on ASCCP guidelines for cervical cytology screening  Cervical  screening: benign  Patient was counseled on current recommendations for breast cancer screening  Mammogram screening: @ 39yo unless new risk factors warrant earlier screening  VitD/Ca supplementation recommendations based on age:  <50yoa - Ca 1000mg/day, VitD 600IU/day  51-70yoa - Ca 1200mg/day, VitD 600IU/day  >71yoa - Ca 1200mg/day, VitD 800IU/day  STD testing: declines   Contraception: continue depo provera  Patient was counseled on irregular bleeding with depo provera. She was informed switching contraception to pills, patches, or NuvaRing would likely make her bleeding more regular/predictable. Patient states she would prefer to stay on depo provera.   Will give her next injection now (1 month early) to see if bleeding pattern becomes more regular.  Consider US if bleeding remains irregular     She was counseled to follow up with her PCP for other routine health maintenance      Marilee Brandon MD  OBGYN, PGY-2

## 2023-02-07 NOTE — PROGRESS NOTES
Adrianna Berger received IM Depo Provera to the left upper outer side of the deltoid on 2/7/2023.    The patient was instructed to get the next injection on 4/25-5/9/2023.    Lot Number: [id number]  Expiration Date: 10/2024  [facility]

## 2023-04-27 ENCOUNTER — DOCUMENTATION ONLY (OUTPATIENT)
Dept: PHARMACY | Facility: CLINIC | Age: 28
End: 2023-04-27

## 2023-04-27 NOTE — PROGRESS NOTES
Patient received IM Depo Provera to the upper outer side of right arm on 04/27/23     Patient was instructed to get the next injection between 7/13-7/27/23 .     Lot Number: [id number]  Expiration Date: 2/25  BY NT

## 2023-07-27 NOTE — PROGRESS NOTES
Adrianna Berger received IM Depo Provera to the left upper outer side of the deltoid on 7/27/2023.    The patient was instructed to get the next injection on 10/12-10/26/23.    Lot Number: [id number]  Expiration Date: 04/30/2027  [facility]